# Patient Record
Sex: FEMALE | Race: BLACK OR AFRICAN AMERICAN | Employment: OTHER | URBAN - METROPOLITAN AREA
[De-identification: names, ages, dates, MRNs, and addresses within clinical notes are randomized per-mention and may not be internally consistent; named-entity substitution may affect disease eponyms.]

---

## 2017-01-19 ENCOUNTER — OFFICE VISIT (OUTPATIENT)
Dept: ONCOLOGY | Age: 72
End: 2017-01-19

## 2017-01-19 ENCOUNTER — HOSPITAL ENCOUNTER (OUTPATIENT)
Dept: LAB | Age: 72
Discharge: HOME | End: 2017-01-19
Payer: MEDICARE

## 2017-01-19 ENCOUNTER — HOSPITAL ENCOUNTER (OUTPATIENT)
Dept: ONCOLOGY | Age: 72
Discharge: HOME | End: 2017-01-19

## 2017-01-19 VITALS
SYSTOLIC BLOOD PRESSURE: 155 MMHG | DIASTOLIC BLOOD PRESSURE: 80 MMHG | HEART RATE: 78 BPM | TEMPERATURE: 98 F | BODY MASS INDEX: 22.99 KG/M2 | WEIGHT: 138 LBS | HEIGHT: 65 IN

## 2017-01-19 DIAGNOSIS — D64.9 ANEMIA, UNSPECIFIED TYPE: Primary | ICD-10-CM

## 2017-01-19 DIAGNOSIS — D46.20 MDS (MYELODYSPLASTIC SYNDROME), LOW GRADE (HCC): ICD-10-CM

## 2017-01-19 DIAGNOSIS — D64.9 ANEMIA, UNSPECIFIED TYPE: ICD-10-CM

## 2017-01-19 LAB
ALBUMIN SERPL BCP-MCNC: 4.4 G/DL (ref 3.4–5)
ALBUMIN/GLOB SERPL: 1.3 {RATIO} (ref 0.8–1.7)
ALP SERPL-CCNC: 129 U/L (ref 45–117)
ALT SERPL-CCNC: 26 U/L (ref 13–56)
ANION GAP BLD CALC-SCNC: 7 MMOL/L (ref 3–18)
AST SERPL W P-5'-P-CCNC: 18 U/L (ref 15–37)
BASO+EOS+MONOS # BLD AUTO: 0.6 K/UL (ref 0–2.3)
BASO+EOS+MONOS # BLD AUTO: 10 % (ref 0.1–17)
BILIRUB SERPL-MCNC: 0.7 MG/DL (ref 0.2–1)
BUN SERPL-MCNC: 22 MG/DL (ref 7–18)
BUN/CREAT SERPL: 18 (ref 12–20)
CALCIUM SERPL-MCNC: 9.4 MG/DL (ref 8.5–10.1)
CHLORIDE SERPL-SCNC: 104 MMOL/L (ref 100–108)
CO2 SERPL-SCNC: 28 MMOL/L (ref 21–32)
CREAT SERPL-MCNC: 1.2 MG/DL (ref 0.6–1.3)
DIFFERENTIAL METHOD BLD: ABNORMAL
ERYTHROCYTE [DISTWIDTH] IN BLOOD BY AUTOMATED COUNT: 12 % (ref 11.5–14.5)
FERRITIN SERPL-MCNC: 67 NG/ML (ref 8–388)
GLOBULIN SER CALC-MCNC: 3.4 G/DL (ref 2–4)
GLUCOSE SERPL-MCNC: 209 MG/DL (ref 74–99)
HCT VFR BLD AUTO: 30.5 % (ref 36–48)
HGB BLD-MCNC: 10.7 G/DL (ref 12–16)
IRON SATN MFR SERPL: 23 %
IRON SERPL-MCNC: 71 UG/DL (ref 50–175)
LYMPHOCYTES # BLD AUTO: 23 % (ref 14–44)
LYMPHOCYTES # BLD: 1.4 K/UL (ref 1.1–5.9)
MCH RBC QN AUTO: 30.3 PG (ref 25–35)
MCHC RBC AUTO-ENTMCNC: 35.1 G/DL (ref 31–37)
MCV RBC AUTO: 86.4 FL (ref 78–102)
NEUTS SEG # BLD: 3.9 K/UL (ref 1.8–9.5)
NEUTS SEG NFR BLD AUTO: 67 % (ref 40–70)
PLATELET # BLD AUTO: 181 K/UL (ref 140–440)
POTASSIUM SERPL-SCNC: 4.3 MMOL/L (ref 3.5–5.5)
PROT SERPL-MCNC: 7.8 G/DL (ref 6.4–8.2)
RBC # BLD AUTO: 3.53 M/UL (ref 4.1–5.1)
SODIUM SERPL-SCNC: 139 MMOL/L (ref 136–145)
TIBC SERPL-MCNC: 311 UG/DL (ref 250–450)
WBC # BLD AUTO: 5.9 K/UL (ref 4.5–13)

## 2017-01-19 PROCEDURE — 80053 COMPREHEN METABOLIC PANEL: CPT | Performed by: NURSE PRACTITIONER

## 2017-01-19 PROCEDURE — 82728 ASSAY OF FERRITIN: CPT | Performed by: NURSE PRACTITIONER

## 2017-01-19 PROCEDURE — 36415 COLL VENOUS BLD VENIPUNCTURE: CPT | Performed by: NURSE PRACTITIONER

## 2017-01-19 PROCEDURE — 83540 ASSAY OF IRON: CPT | Performed by: NURSE PRACTITIONER

## 2017-01-19 NOTE — MR AVS SNAPSHOT
Visit Information Date & Time Provider Department Dept. Phone Encounter #  
 1/19/2017 10:45 AM [facility] [phone] [id number] Follow-up Instructions Return in about 3 months (around 4/19/2017). Your Appointments 4/20/2017 10:45 AM  
Office Visit with MD Zeb [facility]ford Office ([facility]) Appt Note: [address] [facility] [address] [address]  
[address]  
  
   
 [address] [facility] [address] [address] Upcoming Health Maintenance Date Due  
 FOOT EXAM Q1 10/4/1955 MICROALBUMIN Q1 10/4/1954 EYE EXAM RETINAL OR DILATED Q1 10/4/1954 DTaP/Tdap/Td series (1 - Tdap) 10/4/1965 FOBT Q 1 YEAR AGE 50-75 10/4/1994 ZOSTER VACCINE AGE 60> 10/4/2004 GLAUCOMA SCREENING Q2Y 10/4/2009 Pneumococcal 65+ High/Highest Risk (1 of 2 - PCV13) 10/4/2009 MEDICARE YEARLY EXAM 10/4/2009 LIPID PANEL Q1 2/9/2011 HEMOGLOBIN A1C Q6M 4/6/2011 INFLUENZA AGE 9 TO ADULT 8/1/2016 BREAST CANCER SCRN MAMMOGRAM 10/6/2018 Allergies as of 1/19/2017  Review Complete On: 1/19/2017 By: Terrance Traylor NP No Known Allergies Current Immunizations  Never Reviewed No immunizations on file. Not reviewed this visit You Were Diagnosed With   
  
 Codes Comments Anemia, unspecified type    -  Primary ICD-10-CM: D64.9 ICD-9-CM: 285.9 MDS (myelodysplastic syndrome), low grade (HCC)     ICD-10-CM: S46.80 ICD-9-CM: 238.72 Vitals BP Pulse Temp Height(growth percentile) Weight(growth percentile) BMI  
 155/80 78 98 °F (36.7 °C) (Oral) 5' 5\" (1.651 m) 138 lb (62.6 kg) 22.96 kg/m2 Smoking Status Never Smoker BMI and BSA Data Body Mass Index Body Surface Area  
 22.96 kg/m 2 1.69 m 2 Your Updated Medication List  
  
   
This list is accurate as of: 1/19/17 11:19 AM.  Always use your most recent med list.  
  
  
  
  
 aspirin 81 mg tablet Take 81 mg by mouth.  
  
 benzonatate 200 mg capsule Commonly known as:  TESSALON  
  
 carvedilol 25 mg tablet Commonly known as:  Daysi Jigna Take 25 mg by mouth two (2) times daily (with meals). CHERATUSSIN -10 mg/5 mL solution Generic drug:  guaiFENesin-codeine  
  
 ciprofloxacin HCl 0.3 % ophthalmic solution Commonly known as:  CILOXIN  
  
 doxycycline 100 mg capsule Commonly known as:  VIBRAMYCIN  
  
 DUREZOL 0.05 % ophthalmic emulsion Generic drug:  Difluprednate  
  
 glimepiride 1 mg tablet Commonly known as:  AMARYL  
2 mg daily. hydroCHLOROthiazide 12.5 mg capsule Commonly known as:  Vertie Cheadle Take 12.5 mg by mouth daily. levothyroxine 50 mcg tablet Commonly known as:  SYNTHROID Take  by mouth Daily (before breakfast). metFORMIN 500 mg tablet Commonly known as:  GLUCOPHAGE  
two (2) times daily (with meals). montelukast 10 mg tablet Commonly known as:  SINGULAIR Take 10 mg by mouth daily. multivitamin tablet Commonly known as:  ONE A DAY Take 1 Tab by mouth daily. pantoprazole 40 mg tablet Commonly known as:  PROTONIX Take 40 mg by mouth daily. PROVENTIL HFA 90 mcg/actuation inhaler Generic drug:  albuterol  
  
 quinapril 40 mg tablet Commonly known as:  ACCUPRIL Take 40 mg by mouth nightly. simvastatin 40 mg tablet Commonly known as:  ZOCOR Take  by mouth nightly. TRUETEST TEST STRIPS strip Generic drug:  glucose blood VI test strips VITAMIN D2 50,000 unit capsule Generic drug:  ergocalciferol Take 50,000 Units by mouth. We Performed the Following COMPLETE CBC & AUTO DIFF WBC [79456 CPT(R)] Follow-up Instructions Return in about 3 months (around 4/19/2017). To-Do List   
 01/19/2017 Lab:  CBC WITH 3 PART DIFF   
  
 01/20/2017 Lab:  FERRITIN   
  
 01/20/2017   Lab:  IRON PROFILE   
  
 01/20/2017 Lab:  METABOLIC PANEL, COMPREHENSIVE [facility]! Dear Sedrick Larson: 
Thank you for requesting a T[employer] account. Our records indicate that you already have an active Tailwind account. You can access your account anytime at [email or web address] Did you know that you can access your hospital and ER discharge instructions at any time in T[employer]wind? You can also review all of your test results from your hospital stay or ER visit. Additional Information If you have questions, please visit the Frequently Asked Questions section of the Tailwind website at [email or web address] Remember, T[employer]wind is NOT to be used for urgent needs. For medical emergencies, dial 911. Now available from your iPhone and Android! Please provide this summary of care documentation to your next provider. Your primary care clinician is listed as [employer]. If you have any questions after today's visit, please call [phone].

## 2017-01-19 NOTE — PROGRESS NOTES
Hematology/Oncology  Progress Note    Name: Ricki Heath  Date: 2017  : 1945    PCP: Dr. Hermes Santiago    Ms. Catalina Camacho is a 66 y.o.  female with refractory anemia/early MDS. She has a bone marrow biopsy on 13 that revealed no evidence of hematopoetic neoplasm or dysplasia. She has present storage iron and normal female karyotype. Current Therapy: CBC Q3 months, EDUARDO therapy will be warranted if h/h drops below 10 and 30 respectively. Subjective:     Ms. Catalina Camacho is a 67-year-old woman with myelodysplastic syndrome and refractory anemia. The patient presents for routine 3 month follow up. She has no new concerns or complaints to report today. She reports she had a fall about 2 months ago and sustained a contusion on her left side which has healed. The pt denies SOB, CP, unexplained fevers or unexplained weight changes. Her appetite and energy level is reasonably good. She continues to exercise at  least 3-4 times weekly. She continues to take her iron once daily. Past Medical History   Diagnosis Date    Anemia NEC     Blurred vision     Cardiac defibrillator in place     Diabetes mellitus ([address]     Dyspepsia and other specified disorders of function of stomach     Heart attack ([address]     Heart disease     Hemorrhoid     Hypertension     Ill-defined condition      Myeloblastic Syndrome    Joint pain     Pacemaker     Sinus problem     Thyroid disorder      Past Surgical History   Procedure Laterality Date    Hx hysterectomy      Hx thyroidectomy      Hx pacemaker placement      Hx cataract removal Bilateral      Social History     Social History    Marital status: UNKNOWN     Spouse name: N/A    Number of children: N/A    Years of education: N/A     Occupational History    Not on file.      Social History Main Topics    Smoking status: Never Smoker    Smokeless tobacco: Never Used    Alcohol use No    Drug use: No    Sexual activity: No     Other Topics Concern    Not on file     Social History Narrative     Family History   Problem Relation Age of Onset    Diabetes Mother     Diabetes Father     Cancer Father     Heart Disease Father     Hypertension Father     Diabetes Sister     Hypertension Sister     Stroke Sister     Diabetes Sister     Hypertension Sister      Current Outpatient Prescriptions   Medication Sig Dispense Refill    PROVENTIL HFA 90 mcg/actuation inhaler       benzonatate (TESSALON) 200 mg capsule       doxycycline (VIBRAMYCIN) 100 mg capsule       CHERATUSSIN AC  mg/5 mL solution       ciprofloxacin (CILOXIN) 0.3 % ophthalmic solution       DUREZOL 0.05 % ophthalmic emulsion       glimepiride (AMARYL) 1 mg tablet 2 mg daily.  TRUETEST TEST STRIPS strip       metFORMIN (GLUCOPHAGE) 500 mg tablet two (2) times daily (with meals).  levothyroxine (SYNTHROID) 50 mcg tablet Take  by mouth Daily (before breakfast).  ergocalciferol (VITAMIN D2) 50,000 unit capsule Take 50,000 Units by mouth.  carvedilol (COREG) 25 mg tablet Take 25 mg by mouth two (2) times daily (with meals).  hydrochlorothiazide (MICROZIDE) 12.5 mg capsule Take 12.5 mg by mouth daily.  pantoprazole (PROTONIX) 40 mg tablet Take 40 mg by mouth daily.  aspirin 81 mg tablet Take 81 mg by mouth.  quinapril (ACCUPRIL) 40 mg tablet Take 40 mg by mouth nightly.  simvastatin (ZOCOR) 40 mg tablet Take  by mouth nightly.  montelukast (SINGULAIR) 10 mg tablet Take 10 mg by mouth daily.  multivitamin (ONE A DAY) tablet Take 1 Tab by mouth daily. Review of Systems  Constitutional: The patient has no acute distress or discomfort. HEENT: The patient denies recent head trauma, eye pain, blurred vision,  hearing deficit, oropharyngeal mucosal pain or lesions, and the patient denies throat pain or discomfort. Lymphatics: The patient denies palpable peripheral lymphadenopathy.   Hematologic: The patient denies having bruising, bleeding, or progressive fatigue. Respiratory: Patient denies having shortness of breath, cough, sputum production, fever, or dyspnea on exertion. Cardiovascular: The patient denies having leg pain, leg swelling, heart palpitations, chest permit, chest pain, or lightheadedness. The patient denies having dyspnea on exertion. Gastrointestinal: The patient denies having nausea, emesis, or diarrhea. The patient denies having any hematemesis or blood in the stool. Genitourinary: Patient denies having urinary urgency, frequency, or dysuria. The patient denies having blood in the urine. Psychological: The patient denies having symptoms of nervousness, anxiety, depression, or thoughts of harming himself some of this. Skin: Patient denies having skin rashes, skin, ulcerations, or unexplained itching or pruritus. Musculoskeletal: The patient denies having pain in the joints or bones. Objective:     Visit Vitals    /80    Pulse 78    Temp 98 °F (36.7 °C) (Oral)    Ht 5' 5\" (1.651 m)    Wt 62.6 kg (138 lb)    BMI 22.96 kg/m2     ECOG 0 Pain score 0/10  Physical Exam:   Gen. Appearance: The patient is in no acute distress. Skin: no bruise or rashes  HEENT: The exam is unremarkable. Neck: Supple without lymphadenopathy or thyromegaly. Lungs: Clear to auscultation and percussion; there are no wheezes or rhonchi. Heart: Regular rate and rhythm; there are no murmurs, gallops, or rubs. Abdomen: Bowel sounds are present and normal.  There is no guarding, tenderness, or hepatosplenomegaly. Extremities: There is no clubbing, cyanosis, or edema. Neurologic: There are no focal neurologic deficits. Lymphatics: There is no palpable peripheral lymphadenopathy.     Lab data:      Results for orders placed or performed during the hospital encounter of 01/19/17   CBC WITH 3 PART DIFF     Status: Abnormal   Result Value Ref Range Status    WBC 5.9 4.5 - 13.0 K/uL Final    RBC 3.53 (L) 4.10 - 5.10 M/uL Final HGB 10.7 (L) 12.0 - 16.0 g/dL Final    HCT 30.5 (L) 36 - 48 % Final    MCV 86.4 78 - 102 FL Final    MCH 30.3 25.0 - 35.0 PG Final    MCHC 35.1 31 - 37 g/dL Final    RDW 12.0 11.5 - 14.5 % Final    PLATELET 658 902 - 582 K/uL Final    NEUTROPHILS 67 40 - 70 % Final    MIXED CELLS 10 0.1 - 17 % Final    LYMPHOCYTES 23 14 - 44 % Final    ABS. NEUTROPHILS 3.9 1.8 - 9.5 K/UL Final    ABS. MIXED CELLS 0.6 0.0 - 2.3 K/uL Final    ABS. LYMPHOCYTES 1.4 1.1 - 5.9 K/UL Final     Comment: Test performed at [address]. Results Reviewed by Medical Director. DF AUTOMATED   Final           Assessment:     1. Anemia, unspecified type    2. MDS (myelodysplastic syndrome), low grade (HCC)          Plan:     MDS/refractory anemia: WBC is normal today at 5.0K/uL We will continue to monitor the patient closely and should the differential WBC count on the CBC shows any evidence of atypical cells, she may need to undergo further evaluation to rule out any evidence that she is converting to a higher grade MDS or possible chronic myelomonocytic leukemia. Anemia:The h.h has  declined to 10.7/30.5 and platelets [phone]. I did inform her that should the h/h decline below 10 and 30 respectively we will initiate EDUARDO  therapy. She will continue the iron tablet to twice daily. I will  check a CMP, iron panel and ferritin level today. We will see her for routine follow up in 3 months or sooner if indicated.      Orders Placed This Encounter    COMPLETE CBC & AUTO DIFF WBC    InHouse CBC ([place])     Standing Status:   Future     Number of Occurrences:   1     Standing Expiration Date:   3/30/9303    METABOLIC PANEL, COMPREHENSIVE     Standing Status:   Future     Standing Expiration Date:   1/20/2018    IRON PROFILE     Standing Status:   Future     Standing Expiration Date:   1/20/2018    FERRITIN     Standing Status:   Future     Standing Expiration Date:   1/20/2018 Lashawn Hartmann, MAI  January 19,2017

## 2017-05-03 ENCOUNTER — HOSPITAL ENCOUNTER (OUTPATIENT)
Dept: ONCOLOGY | Age: 72
Discharge: HOME | End: 2017-05-03

## 2017-05-03 ENCOUNTER — OFFICE VISIT (OUTPATIENT)
Dept: ONCOLOGY | Age: 72
End: 2017-05-03

## 2017-05-03 ENCOUNTER — HOSPITAL ENCOUNTER (OUTPATIENT)
Dept: LAB | Age: 72
Discharge: HOME | End: 2017-05-03
Payer: MEDICARE

## 2017-05-03 VITALS
HEART RATE: 79 BPM | HEIGHT: 65 IN | WEIGHT: 139 LBS | TEMPERATURE: 98.1 F | SYSTOLIC BLOOD PRESSURE: 110 MMHG | DIASTOLIC BLOOD PRESSURE: 62 MMHG | BODY MASS INDEX: 23.16 KG/M2

## 2017-05-03 DIAGNOSIS — D64.9 ANEMIA, UNSPECIFIED TYPE: ICD-10-CM

## 2017-05-03 DIAGNOSIS — D64.9 ANEMIA, UNSPECIFIED TYPE: Primary | ICD-10-CM

## 2017-05-03 DIAGNOSIS — D46.20 MDS (MYELODYSPLASTIC SYNDROME), LOW GRADE (HCC): ICD-10-CM

## 2017-05-03 LAB
ALBUMIN SERPL BCP-MCNC: 4.1 G/DL (ref 3.4–5)
ALBUMIN/GLOB SERPL: 1.1 {RATIO} (ref 0.8–1.7)
ALP SERPL-CCNC: 64 U/L (ref 45–117)
ALT SERPL-CCNC: 20 U/L (ref 13–56)
ANION GAP BLD CALC-SCNC: 10 MMOL/L (ref 3–18)
AST SERPL W P-5'-P-CCNC: 17 U/L (ref 15–37)
BASO+EOS+MONOS # BLD AUTO: 0.7 K/UL (ref 0–2.3)
BASO+EOS+MONOS # BLD AUTO: 10 % (ref 0.1–17)
BILIRUB SERPL-MCNC: 0.5 MG/DL (ref 0.2–1)
BUN SERPL-MCNC: 20 MG/DL (ref 7–18)
BUN/CREAT SERPL: 15 (ref 12–20)
CALCIUM SERPL-MCNC: 9.2 MG/DL (ref 8.5–10.1)
CHLORIDE SERPL-SCNC: 101 MMOL/L (ref 100–108)
CO2 SERPL-SCNC: 25 MMOL/L (ref 21–32)
CREAT SERPL-MCNC: 1.3 MG/DL (ref 0.6–1.3)
DIFFERENTIAL METHOD BLD: ABNORMAL
ERYTHROCYTE [DISTWIDTH] IN BLOOD BY AUTOMATED COUNT: 11.9 % (ref 11.5–14.5)
FERRITIN SERPL-MCNC: 121 NG/ML (ref 8–388)
GLOBULIN SER CALC-MCNC: 3.6 G/DL (ref 2–4)
GLUCOSE SERPL-MCNC: 162 MG/DL (ref 74–99)
HCT VFR BLD AUTO: 31.5 % (ref 36–48)
HGB BLD-MCNC: 10.8 G/DL (ref 12–16)
IRON SATN MFR SERPL: 23 %
IRON SERPL-MCNC: 66 UG/DL (ref 50–175)
LYMPHOCYTES # BLD AUTO: 25 % (ref 14–44)
LYMPHOCYTES # BLD: 1.7 K/UL (ref 1.1–5.9)
MCH RBC QN AUTO: 29.9 PG (ref 25–35)
MCHC RBC AUTO-ENTMCNC: 34.3 G/DL (ref 31–37)
MCV RBC AUTO: 87.3 FL (ref 78–102)
NEUTS SEG # BLD: 4.2 K/UL (ref 1.8–9.5)
NEUTS SEG NFR BLD AUTO: 65 % (ref 40–70)
PLATELET # BLD AUTO: 193 K/UL (ref 140–440)
POTASSIUM SERPL-SCNC: 4.6 MMOL/L (ref 3.5–5.5)
PROT SERPL-MCNC: 7.7 G/DL (ref 6.4–8.2)
RBC # BLD AUTO: 3.61 M/UL (ref 4.1–5.1)
SODIUM SERPL-SCNC: 136 MMOL/L (ref 136–145)
TIBC SERPL-MCNC: 287 UG/DL (ref 250–450)
WBC # BLD AUTO: 6.6 K/UL (ref 4.5–13)

## 2017-05-03 PROCEDURE — 80053 COMPREHEN METABOLIC PANEL: CPT | Performed by: NURSE PRACTITIONER

## 2017-05-03 PROCEDURE — 36415 COLL VENOUS BLD VENIPUNCTURE: CPT | Performed by: NURSE PRACTITIONER

## 2017-05-03 PROCEDURE — 82728 ASSAY OF FERRITIN: CPT | Performed by: NURSE PRACTITIONER

## 2017-05-03 PROCEDURE — 83540 ASSAY OF IRON: CPT | Performed by: NURSE PRACTITIONER

## 2017-05-03 NOTE — PROGRESS NOTES
Hematology/Oncology  Progress Note    Name: Glo Berry  Date: 2017  : 1945    PCP: Dr. Yolanda Brantley    Ms. Enrico Samuels is a 66 y.o.  female with refractory anemia/early MDS. She has a bone marrow biopsy on 13 that revealed no evidence of hematopoetic neoplasm or dysplasia. She has present storage iron and normal female karyotype. Current Therapy: CBC Q3 months, EDUARDO therapy will be warranted if h/h drops below 10 and 30 respectively. Subjective:     Ms. Enrico Samuels is a [id number] woman with myelodysplastic syndrome and refractory anemia. The patient presents for routine 3 month follow up. She reports she had a fall about 3 months ago and sustained a contusion on her left side which has healed. The pt denies SOB, CP, unexplained fevers or unexplained weight changes. Her appetite and energy level is reasonably good. She states she has not exercised in about 3 months due to her fall. She notes she has joined [employer] and lost 5 lbs so far. She also notes she had an U/S of her neck and 3 thyroid nodules were found incidently. These are monitored regularly by her PCP. She continues to take her iron once daily. Past Medical History:   Diagnosis Date    Anemia NEC     Blurred vision     Cardiac defibrillator in place     Diabetes mellitus ([address]     Dyspepsia and other specified disorders of function of stomach     Heart attack ([address]     Heart disease     Hemorrhoid     Hypertension     Ill-defined condition     Myeloblastic Syndrome    Joint pain     Pacemaker     Sinus problem     Thyroid disorder      Past Surgical History:   Procedure Laterality Date    HX CATARACT REMOVAL Bilateral     HX HYSTERECTOMY      HX PACEMAKER PLACEMENT      HX THYROIDECTOMY       Social History     Social History    Marital status: UNKNOWN     Spouse name: N/A    Number of children: N/A    Years of education: N/A     Occupational History    Not on file.      Social History Main Topics  Smoking status: Never Smoker    Smokeless tobacco: Never Used    Alcohol use No    Drug use: No    Sexual activity: No     Other Topics Concern    Not on file     Social History Narrative     Family History   Problem Relation Age of Onset    Diabetes Mother     Diabetes Father     Cancer Father     Heart Disease Father     Hypertension Father     Diabetes Sister     Hypertension Sister     Stroke Sister     Diabetes Sister     Hypertension Sister      Current Outpatient Prescriptions   Medication Sig Dispense Refill    PROVENTIL HFA 90 mcg/actuation inhaler       benzonatate (TESSALON) 200 mg capsule       doxycycline (VIBRAMYCIN) 100 mg capsule       CHERATUSSIN AC  mg/5 mL solution       ciprofloxacin (CILOXIN) 0.3 % ophthalmic solution       DUREZOL 0.05 % ophthalmic emulsion       glimepiride (AMARYL) 1 mg tablet 2 mg daily.  TRUETEST TEST STRIPS strip       metFORMIN (GLUCOPHAGE) 500 mg tablet two (2) times daily (with meals).  levothyroxine (SYNTHROID) 50 mcg tablet Take  by mouth Daily (before breakfast).  ergocalciferol (VITAMIN D2) 50,000 unit capsule Take 50,000 Units by mouth.  carvedilol (COREG) 25 mg tablet Take 25 mg by mouth two (2) times daily (with meals).  hydrochlorothiazide (MICROZIDE) 12.5 mg capsule Take 12.5 mg by mouth daily.  pantoprazole (PROTONIX) 40 mg tablet Take 40 mg by mouth daily.  aspirin 81 mg tablet Take 81 mg by mouth.  quinapril (ACCUPRIL) 40 mg tablet Take 40 mg by mouth nightly.  simvastatin (ZOCOR) 40 mg tablet Take  by mouth nightly.  montelukast (SINGULAIR) 10 mg tablet Take 10 mg by mouth daily.  multivitamin (ONE A DAY) tablet Take 1 Tab by mouth daily. Review of Systems  Constitutional: The patient has no acute distress or discomfort.   HEENT: The patient denies recent head trauma, eye pain, blurred vision,  hearing deficit, oropharyngeal mucosal pain or lesions, and the patient denies throat pain or discomfort. + 3 thyroid nodules. Lymphatics: The patient denies palpable peripheral lymphadenopathy. Hematologic: The patient denies having bruising, bleeding, or progressive fatigue. Respiratory: Patient denies having shortness of breath, cough, sputum production, fever, or dyspnea on exertion. Cardiovascular: The patient denies having leg pain, leg swelling, heart palpitations, chest permit, chest pain, or lightheadedness. The patient denies having dyspnea on exertion. Gastrointestinal: The patient denies having nausea, emesis, or diarrhea. The patient denies having any hematemesis or blood in the stool. Genitourinary: Patient denies having urinary urgency, frequency, or dysuria. The patient denies having blood in the urine. Psychological: The patient denies having symptoms of nervousness, anxiety, depression, or thoughts of harming himself some of this. Skin: Patient denies having skin rashes, skin, ulcerations, or unexplained itching or pruritus. Musculoskeletal: The patient denies having pain in the joints or bones. Objective:     Visit Vitals    /62    Pulse 79    Temp 98.1 °F (36.7 °C)    Ht 5' 5\" (1.651 m)    Wt 63 kg (139 lb)    BMI 23.13 kg/m2     ECOG 0 Pain score 0/10  Physical Exam:   Gen. Appearance: The patient is in no acute distress. Skin: no bruise or rashes  HEENT: The exam is unremarkable. Neck: Supple without lymphadenopathy or thyromegaly. Lungs: Clear to auscultation and percussion; there are no wheezes or rhonchi. Heart: Regular rate and rhythm; there are no murmurs, gallops, or rubs. Abdomen: Bowel sounds are present and normal.  There is no guarding, tenderness, or hepatosplenomegaly. Extremities: There is no clubbing, cyanosis, or edema. Neurologic: There are no focal neurologic deficits. Lymphatics: There is no palpable peripheral lymphadenopathy.     Lab data:      Results for orders placed or performed during the hospital encounter of 05/03/17   CBC WITH 3 PART DIFF     Status: Abnormal   Result Value Ref Range Status    WBC 6.6 4.5 - 13.0 K/uL Final    RBC 3.61 (L) 4.10 - 5.10 M/uL Final    HGB 10.8 (L) 12.0 - 16.0 g/dL Final    HCT 31.5 (L) 36 - 48 % Final    MCV 87.3 78 - 102 FL Final    MCH 29.9 25.0 - 35.0 PG Final    MCHC 34.3 31 - 37 g/dL Final    RDW 11.9 11.5 - 14.5 % Final    PLATELET 528 260 - 514 K/uL Final    NEUTROPHILS 65 40 - 70 % Final    MIXED CELLS 10 0.1 - 17 % Final    LYMPHOCYTES 25 14 - 44 % Final    ABS. NEUTROPHILS 4.2 1.8 - 9.5 K/UL Final    ABS. MIXED CELLS 0.7 0.0 - 2.3 K/uL Final    ABS. LYMPHOCYTES 1.7 1.1 - 5.9 K/UL Final     Comment: Test performed at [place] [address] Location. Results Reviewed by Medical Director. DF AUTOMATED   Final           Assessment:     1. Anemia, unspecified type    2. MDS (myelodysplastic syndrome), low grade (HCC)          Plan:     MDS/refractory anemia: WBC is normal today at 6.6 K/uL We will continue to monitor the patient closely and should the differential WBC count on the CBC shows any evidence of atypical cells, she may need to undergo further evaluation to rule out any evidence that she is converting to a higher grade MDS or possible chronic myelomonocytic leukemia. Anemia:The h.h remains stable at  to 10.8/31.5 and platelets [phone]. I did inform her that should the h/h decline below 10 and 30 respectively we will initiate EDUARDO  therapy. She will continue the iron tablets to twice daily. I will  check a CMP, iron panel and ferritin level today. We will see her for routine follow up in 3 months or sooner if indicated.        Orders Placed This Encounter    COMPLETE CBC & AUTO DIFF WBC    InHouse CBC ([place])     Standing Status:   Future     Number of Occurrences:   1     Standing Expiration Date:   8/19/5953    METABOLIC PANEL, COMPREHENSIVE     Standing Status:   Future     Number of Occurrences:   1     Standing Expiration Date:   5/4/2018  IRON PROFILE     Standing Status:   Future     Number of Occurrences:   1     Standing Expiration Date:   5/4/2018    FERRITIN     Standing Status:   Future     Number of Occurrences:   1     Standing Expiration Date:   5/4/2018       Evelyn oKch MD  5/3/2017

## 2017-05-03 NOTE — MR AVS SNAPSHOT
Visit Information Date & Time Provider Department Dept. Phone Encounter #  
 5/3/2017  4:15 PM Sanjana Adams MD [facility] Office [phone] [id number] Your Appointments 8/3/2017  9:30 AM  
[facility] [address]) Appt Note: 3 month return [address] [address] [address] [address]  
[address] [place]  
  
   
 [address] [facility] [address] [address] Upcoming Health Maintenance Date Due DTaP/Tdap/Td series (1 - Tdap) 10/4/1965 FOBT Q 1 YEAR AGE 50-75 10/4/1994 ZOSTER VACCINE AGE 60> 10/4/2004 GLAUCOMA SCREENING Q2Y 10/4/2009 Pneumococcal 65+ High/Highest Risk (1 of 2 - PCV13) 10/4/2009 MEDICARE YEARLY EXAM 10/4/2009 INFLUENZA AGE 9 TO ADULT 8/1/2017 BREAST CANCER SCRN MAMMOGRAM 10/6/2018 Allergies as of 5/3/2017  Review Complete On: 5/3/2017 By: Stacy Nesbitt NP No Known Allergies Current Immunizations  Never Reviewed No immunizations on file. Not reviewed this visit You Were Diagnosed With   
  
 Codes Comments Anemia, unspecified type    -  Primary ICD-10-CM: D64.9 ICD-9-CM: 285.9 MDS (myelodysplastic syndrome), low grade (HCC)     ICD-10-CM: E61.66 ICD-9-CM: 238.72 Vitals BP Pulse Temp Height(growth percentile) Weight(growth percentile) BMI  
 110/62 79 98.1 °F (36.7 °C) 5' 5\" (1.651 m) 139 lb (63 kg) 23.13 kg/m2 Smoking Status Never Smoker Vitals History BMI and BSA Data Body Mass Index Body Surface Area  
 23.13 kg/m 2 1.7 m 2 Your Updated Medication List  
  
   
This list is accurate as of: 5/3/17  4:36 PM.  Always use your most recent med list.  
  
  
  
  
 aspirin 81 mg tablet Take 81 mg by mouth.  
  
 benzonatate 200 mg capsule Commonly known as:  TESSALON  
  
 carvedilol 25 mg tablet Commonly known as:  Tesha Stable  
 Take 25 mg by mouth two (2) times daily (with meals). CHERATUSSIN -10 mg/5 mL solution Generic drug:  guaiFENesin-codeine  
  
 ciprofloxacin HCl 0.3 % ophthalmic solution Commonly known as:  CILOXIN  
  
 doxycycline 100 mg capsule Commonly known as:  VIBRAMYCIN  
  
 DUREZOL 0.05 % ophthalmic emulsion Generic drug:  Difluprednate  
  
 glimepiride 1 mg tablet Commonly known as:  AMARYL  
2 mg daily. hydroCHLOROthiazide 12.5 mg capsule Commonly known as:  Karla Dellen Take 12.5 mg by mouth daily. levothyroxine 50 mcg tablet Commonly known as:  SYNTHROID Take  by mouth Daily (before breakfast). metFORMIN 500 mg tablet Commonly known as:  GLUCOPHAGE  
two (2) times daily (with meals). montelukast 10 mg tablet Commonly known as:  SINGULAIR Take 10 mg by mouth daily. multivitamin tablet Commonly known as:  ONE A DAY Take 1 Tab by mouth daily. pantoprazole 40 mg tablet Commonly known as:  PROTONIX Take 40 mg by mouth daily. PROVENTIL HFA 90 mcg/actuation inhaler Generic drug:  albuterol  
  
 quinapril 40 mg tablet Commonly known as:  ACCUPRIL Take 40 mg by mouth nightly. simvastatin 40 mg tablet Commonly known as:  ZOCOR Take  by mouth nightly. TRUETEST TEST STRIPS strip Generic drug:  glucose blood VI test strips VITAMIN D2 50,000 unit capsule Generic drug:  ergocalciferol Take 50,000 Units by mouth. We Performed the Following COMPLETE CBC & AUTO DIFF WBC [27447 CPT(R)] To-Do List   
 05/03/2017 Lab:  CBC WITH 3 PART DIFF Patient Instructions Anemia: Care Instructions Your Care Instructions Anemia is a low level of red blood cells, which carry oxygen throughout your body. Many things can cause anemia. Lack of iron is one of the most common causes.  Your body needs iron to make hemoglobin, a substance in red blood cells that carries oxygen from the lungs to your body's cells. Without enough iron, the body produces fewer and smaller red blood cells. As a result, your body's cells do not get enough oxygen, and you feel tired and weak. And you may have trouble concentrating. Bleeding is the most common cause of a lack of iron. You may have heavy menstrual bleeding or bleeding caused by conditions such as ulcers, hemorrhoids, or cancer. Regular use of aspirin or other anti-inflammatory medicines (such as ibuprofen) also can cause bleeding in some people. A lack of iron in your diet also can cause anemia, especially at times when the body needs more iron, such as during pregnancy, infancy, and the teen years. Your doctor may have prescribed iron pills. It may take several months of treatment for your iron levels to return to normal. Your doctor also may suggest that you eat foods that are rich in iron, such as meat and beans. There are many other causes of anemia. It is not always due to a lack of iron. Finding the specific cause of your anemia will help your doctor find the right treatment for you. Follow-up care is a key part of your treatment and safety. Be sure to make and go to all appointments, and call your doctor if you are having problems. It's also a good idea to know your test results and keep a list of the medicines you take. How can you care for yourself at home? · Take your medicines exactly as prescribed. Call your doctor if you think you are having a problem with your medicine. · If your doctor recommends iron pills, take them as directed: ¨ Try to take the pills on an empty stomach about 1 hour before or 2 hours after meals. But you may need to take iron with food to avoid an upset stomach.  
¨ Do not take antacids or drink milk or caffeine drinks (such as coffee, tea, or cola) at the same time or within 2 hours of the time that you take your iron. They can make it hard for your body to absorb the iron. ¨ Vitamin C (from food or supplements) helps your body absorb iron. Try taking iron pills with a glass of orange juice or some other food that is high in vitamin C, such as citrus fruits. ¨ Iron pills may cause stomach problems, such as heartburn, nausea, diarrhea, constipation, and cramps. Be sure to drink plenty of fluids, and include fruits, vegetables, and fiber in your diet each day. Iron pills often make your bowel movements dark or green. ¨ If you forget to take an iron pill, do not take a double dose of iron the next time you take a pill. ¨ Keep iron pills out of the reach of small children. An overdose of iron can be very dangerous. · Follow your doctor's advice about eating iron-rich foods. These include red meat, shellfish, poultry, eggs, beans, raisins, whole-grain bread, and leafy green vegetables. · Steam vegetables to help them keep their iron content. When should you call for help? Call 911 anytime you think you may need emergency care. For example, call if: 
· You have symptoms of a heart attack. These may include: ¨ Chest pain or pressure, or a strange feeling in the chest. 
¨ Sweating. ¨ Shortness of breath. ¨ Nausea or vomiting. ¨ Pain, pressure, or a strange feeling in the back, neck, jaw, or upper belly or in one or both shoulders or arms. ¨ Lightheadedness or sudden weakness. ¨ A fast or irregular heartbeat. After you call 911, the  may tell you to chew 1 adult-strength or 2 to 4 low-dose aspirin. Wait for an ambulance. Do not try to drive yourself. · You passed out (lost consciousness). Call your doctor now or seek immediate medical care if: 
· You have new or increased shortness of breath. · You are dizzy or lightheaded, or you feel like you may faint. · Your fatigue and weakness continue or get worse. · You have any abnormal bleeding, such as: 
¨ Nosebleeds. ¨ Vaginal bleeding that is different (heavier, more frequent, at a different time of the month) than what you are used to. ¨ Bloody or black stools, or rectal bleeding. ¨ Bloody or pink urine. Watch closely for changes in your health, and be sure to contact your doctor if: 
· You do not get better as expected. Where can you learn more? Go to http://bindu-apollo.info/. Enter R301 in the search box to learn more about \"Anemia: Care Instructions. \" Current as of: October 13, 2016 Content Version: 11.2 © 9575-3262 [email or web address]. Care instructions adapted under license by [employer] (which disclaims liability or warranty for this information). If you have questions about a medical condition or this instruction, always ask your healthcare professional. [address] any warranty or liability for your use of this information. Introducing [facility] HE[facility]! Dear Ana Laura Tolbert: 
Thank you for requesting a [employer] account. Our records indicate that you already have an active [employer] account. You can access your account anytime at [email or web address] Did you know that you can access your hospital and ER discharge instructions at any time in [employer]? You can also review all of your test results from your hospital stay or ER visit. Additional Information If you have questions, please visit the Frequently Asked Questions section of the [employer] website at [email or web address] Remember, [employer] is NOT to be used for urgent needs. For medical emergencies, dial 911. Now available from your iPhone and Android! Please provide this summary of care documentation to your next provider. Your primary care clinician is listed as spigit. If you have any questions after today's visit, please call [address].

## 2017-05-03 NOTE — PATIENT INSTRUCTIONS
Anemia: Care Instructions  Your Care Instructions    Anemia is a low level of red blood cells, which carry oxygen throughout your body. Many things can cause anemia. Lack of iron is one of the most common causes. Your body needs iron to make hemoglobin, a substance in red blood cells that carries oxygen from the lungs to your body's cells. Without enough iron, the body produces fewer and smaller red blood cells. As a result, your body's cells do not get enough oxygen, and you feel tired and weak. And you may have trouble concentrating. Bleeding is the most common cause of a lack of iron. You may have heavy menstrual bleeding or bleeding caused by conditions such as ulcers, hemorrhoids, or cancer. Regular use of aspirin or other anti-inflammatory medicines (such as ibuprofen) also can cause bleeding in some people. A lack of iron in your diet also can cause anemia, especially at times when the body needs more iron, such as during pregnancy, infancy, and the teen years. Your doctor may have prescribed iron pills. It may take several months of treatment for your iron levels to return to normal. Your doctor also may suggest that you eat foods that are rich in iron, such as meat and beans. There are many other causes of anemia. It is not always due to a lack of iron. Finding the specific cause of your anemia will help your doctor find the right treatment for you. Follow-up care is a key part of your treatment and safety. Be sure to make and go to all appointments, and call your doctor if you are having problems. It's also a good idea to know your test results and keep a list of the medicines you take. How can you care for yourself at home? · Take your medicines exactly as prescribed. Call your doctor if you think you are having a problem with your medicine. · If your doctor recommends iron pills, take them as directed:  ¨ Try to take the pills on an empty stomach about 1 hour before or 2 hours after meals. But you may need to take iron with food to avoid an upset stomach. ¨ Do not take antacids or drink milk or caffeine drinks (such as coffee, tea, or cola) at the same time or within 2 hours of the time that you take your iron. They can make it hard for your body to absorb the iron. ¨ Vitamin C (from food or supplements) helps your body absorb iron. Try taking iron pills with a glass of orange juice or some other food that is high in vitamin C, such as citrus fruits. ¨ Iron pills may cause stomach problems, such as heartburn, nausea, diarrhea, constipation, and cramps. Be sure to drink plenty of fluids, and include fruits, vegetables, and fiber in your diet each day. Iron pills often make your bowel movements dark or green. ¨ If you forget to take an iron pill, do not take a double dose of iron the next time you take a pill. ¨ Keep iron pills out of the reach of small children. An overdose of iron can be very dangerous. · Follow your doctor's advice about eating iron-rich foods. These include red meat, shellfish, poultry, eggs, beans, raisins, whole-grain bread, and leafy green vegetables. · Steam vegetables to help them keep their iron content. When should you call for help? Call 911 anytime you think you may need emergency care. For example, call if:  · You have symptoms of a heart attack. These may include:  ¨ Chest pain or pressure, or a strange feeling in the chest.  ¨ Sweating. ¨ Shortness of breath. ¨ Nausea or vomiting. ¨ Pain, pressure, or a strange feeling in the back, neck, jaw, or upper belly or in one or both shoulders or arms. ¨ Lightheadedness or sudden weakness. ¨ A fast or irregular heartbeat. After you call 911, the  may tell you to chew 1 adult-strength or 2 to 4 low-dose aspirin. Wait for an ambulance. Do not try to drive yourself. · You passed out (lost consciousness).   Call your doctor now or seek immediate medical care if:  · You have new or increased shortness of breath. · You are dizzy or lightheaded, or you feel like you may faint. · Your fatigue and weakness continue or get worse. · You have any abnormal bleeding, such as:  ¨ Nosebleeds. ¨ Vaginal bleeding that is different (heavier, more frequent, at a different time of the month) than what you are used to. ¨ Bloody or black stools, or rectal bleeding. ¨ Bloody or pink urine. Watch closely for changes in your health, and be sure to contact your doctor if:  · You do not get better as expected. Where can you learn more? Go to http://bindu-apollo.info/. Enter R301 in the search box to learn more about \"Anemia: Care Instructions. \"  Current as of: October 13, 2016  Content Version: 11.2  © 9470-7012 [employer]. Care instructions adapted under license by [employer] (which disclaims liability or warranty for this information). If you have questions about a medical condition or this instruction, always ask your healthcare professional. [place] [address] any warranty or liability for your use of this information.

## 2017-08-03 ENCOUNTER — OFFICE VISIT (OUTPATIENT)
Dept: ONCOLOGY | Age: 72
End: 2017-08-03

## 2017-08-03 ENCOUNTER — HOSPITAL ENCOUNTER (OUTPATIENT)
Dept: ONCOLOGY | Age: 72
Discharge: HOME | End: 2017-08-03

## 2017-08-03 VITALS
DIASTOLIC BLOOD PRESSURE: 85 MMHG | HEIGHT: 65 IN | HEART RATE: 66 BPM | BODY MASS INDEX: 21.86 KG/M2 | WEIGHT: 131.2 LBS | TEMPERATURE: 97.3 F | SYSTOLIC BLOOD PRESSURE: 152 MMHG

## 2017-08-03 DIAGNOSIS — D64.9 CHRONIC ANEMIA: ICD-10-CM

## 2017-08-03 DIAGNOSIS — D46.20 MDS (MYELODYSPLASTIC SYNDROME), LOW GRADE (HCC): Primary | ICD-10-CM

## 2017-08-03 DIAGNOSIS — D46.20 MDS (MYELODYSPLASTIC SYNDROME), LOW GRADE (HCC): ICD-10-CM

## 2017-08-03 LAB
BASO+EOS+MONOS # BLD AUTO: 0.5 K/UL (ref 0–2.3)
BASO+EOS+MONOS # BLD AUTO: 9 % (ref 0.1–17)
DIFFERENTIAL METHOD BLD: ABNORMAL
ERYTHROCYTE [DISTWIDTH] IN BLOOD BY AUTOMATED COUNT: 12.7 % (ref 11.5–14.5)
HCT VFR BLD AUTO: 33.1 % (ref 36–48)
HGB BLD-MCNC: 11.4 G/DL (ref 12–16)
LYMPHOCYTES # BLD AUTO: 22 % (ref 14–44)
LYMPHOCYTES # BLD: 1.1 K/UL (ref 1.1–5.9)
MCH RBC QN AUTO: 30.5 PG (ref 25–35)
MCHC RBC AUTO-ENTMCNC: 34.4 G/DL (ref 31–37)
MCV RBC AUTO: 88.5 FL (ref 78–102)
NEUTS SEG # BLD: 3.6 K/UL (ref 1.8–9.5)
NEUTS SEG NFR BLD AUTO: 69 % (ref 40–70)
PLATELET # BLD AUTO: 167 K/UL (ref 140–440)
RBC # BLD AUTO: 3.74 M/UL (ref 4.1–5.1)
WBC # BLD AUTO: 5.2 K/UL (ref 4.5–13)

## 2017-08-03 NOTE — PATIENT INSTRUCTIONS
Myelodysplastic Syndromes: Care Instructions  Your Care Instructions  Myelodysplastic syndromes, also called MDS, are a group of rare conditions in which the bone marrow does not make enough healthy blood cells. Normally, the bone marrow makes red blood cells, white blood cells, and platelets. These cells carry oxygen in the blood, help the body fight infections, and help the blood clot. With MDS, you may feel weak and tired, get infections often, and bruise easily, although symptoms tend to vary. MDS is a form of blood cancer. In some cases, MDS can turn into acute myeloid leukemia (AML), another type of cancer. Some people develop MDS after treatment for cancer or exposure to pesticides or other chemicals. But in most cases, the cause of MDS is not known. Your doctor will use the results of blood tests to guide your treatment. There are many types of MDS, with different treatment plans for each. If you have enough red blood cells and are feeling all right, you may not need active treatment, but you and your doctor will want to watch your condition carefully. If you start feeling lightheaded and have no energy, you may need a blood transfusion. Your doctor also may give you antibiotics to prevent or treat infection. Follow-up care is a key part of your treatment and safety. Be sure to make and go to all appointments, and call your doctor if you are having problems. It's also a good idea to know your test results and keep a list of the medicines you take. How can you care for yourself at home? · Take your medicines exactly as prescribed. Call your doctor if you think you are having a problem with your medicine. You will get more details on the specific medicines your doctor prescribes. · If your doctor prescribed antibiotics, take them as directed. Do not stop taking them just because you feel better. You need to take the full course of antibiotics.  If you have side effects from antibiotics, tell your doctor. · Take steps to control your stress and workload. Learn relaxation techniques. ¨ Share your feelings. Stress and tension affect our emotions. By expressing your feelings to others, you may be able to understand and cope with them. ¨ Consider joining a support group. Talking about a problem with your spouse, a good friend, or other people with similar problems is a good way to reduce tension and stress. ¨ Express yourself with art. Try writing, crafts, dance, or art to relieve stress. ¨ Be kind to your body and mind. Getting enough sleep, eating a healthy diet, and taking time to do things you enjoy can contribute to an overall feeling of balance in your life and help reduce stress. ¨ Get help if you need it. Discuss your concerns with your doctor or counselor. · Do not smoke. Smoking can make blood problems worse. If you need help quitting, talk to your doctor about stop-smoking programs and medicines. These can increase your chances of quitting for good. · If you have not already done so, prepare a list of advance directives. Advance directives are instructions to your doctor and family members about what kind of care you want if you become unable to speak or express yourself. · Call the [employer] (2-266.800.2359) or visit its website at [employer] for more information. When should you call for help? Call 911 anytime you think you may need emergency care. For example, call if:  · You passed out (lost consciousness). · You vomit blood or what looks like coffee grounds. · You pass maroon or very bloody stools. Call your doctor now or seek immediate medical care if:  · Your stools are black and tarlike or have streaks of blood. · You have any unusual bleeding, such as:  ¨ Blood spots under the skin. ¨ A nosebleed that you cannot stop. ¨ Bleeding gums when you brush your teeth. ¨ Blood in your urine.   ¨ Vaginal bleeding when you are not having your period, or heavy period bleeding. · Your fatigue and weakness continue or get worse. · You have signs of infection, such as:  ¨ Increased pain, swelling, warmth, or redness. ¨ Red streaks leading from a sore. ¨ Pus draining from a sore. ¨ A fever. Watch closely for changes in your health, and be sure to contact your doctor if you have any problems. Where can you learn more? Go to http://bindu-apollo.info/. Enter Alirio Banegas in the search box to learn more about \"Myelodysplastic Syndromes: Care Instructions. \"  Current as of: October 24, 2016  Content Version: 11.3  © 6818-6976 [employer]. Care instructions adapted under license by [employer] (which disclaims liability or warranty for this information). If you have questions about a medical condition or this instruction, always ask your healthcare professional. [address] any warranty or liability for your use of this information.

## 2017-08-03 NOTE — MR AVS SNAPSHOT
Visit Information Date & Time Provider Department Dept. Phone Encounter #  
 8/3/2017  9:30 AM Jhon Addison MD [facility] Office [phone] [id number] Follow-up Instructions Return in about 3 months (around 11/3/2017). Your Appointments 11/2/2017 10:00 AM  
Office Visit with Jhon Addison MD  
[facility] Office [address]) Appt Note: 3 MO  [facility] [address] [address]  
[address]  
  
   
 [address] [facility] [address] [address] Upcoming Health Maintenance Date Due DTaP/Tdap/Td series (1 - Tdap) 10/4/1965 FOBT Q 1 YEAR AGE 50-75 10/4/1994 ZOSTER VACCINE AGE 60> 8/4/2004 GLAUCOMA SCREENING Q2Y 10/4/2009 Pneumococcal 65+ High/Highest Risk (1 of 2 - PCV13) 10/4/2009 MEDICARE YEARLY EXAM 10/4/2009 INFLUENZA AGE 9 TO ADULT 8/1/2017 BREAST CANCER SCRN MAMMOGRAM 10/6/2018 Allergies as of 8/3/2017  Review Complete On: 8/3/2017 By: Jhon Addison MD  
 No Known Allergies Current Immunizations  Never Reviewed No immunizations on file. Not reviewed this visit You Were Diagnosed With   
  
 Codes Comments MDS (myelodysplastic syndrome), low grade (HCC)    -  Primary ICD-10-CM: B89.73 ICD-9-CM: 238.72 Chronic anemia     ICD-10-CM: D64.9 ICD-9-CM: 673. 9 Vitals BP Pulse Temp Height(growth percentile) Weight(growth percentile) BMI  
 152/85 (BP 1 Location: Right arm, BP Patient Position: Sitting) 66 97.3 °F (36.3 °C) (Oral) 5' 5\" (1.651 m) 131 lb 3.2 oz (59.5 kg) 21.83 kg/m2 Smoking Status Never Smoker BMI and BSA Data Body Mass Index Body Surface Area  
 21.83 kg/m 2 1.65 m 2 Your Updated Medication List  
  
   
This list is accurate as of: 8/3/17 10:14 AM.  Always use your most recent med list.  
  
  
  
  
 aspirin 81 mg tablet Take 81 mg by mouth.  
  
 benzonatate 200 mg capsule Commonly known as:  TESSALON  
  
 carvedilol 25 mg tablet Commonly known as:  Brady Valera Take 25 mg by mouth two (2) times daily (with meals). CHERATUSSIN -10 mg/5 mL solution Generic drug:  guaiFENesin-codeine  
  
 ciprofloxacin HCl 0.3 % ophthalmic solution Commonly known as:  CILOXIN  
  
 doxycycline 100 mg capsule Commonly known as:  VIBRAMYCIN  
  
 DUREZOL 0.05 % ophthalmic emulsion Generic drug:  Difluprednate  
  
 glimepiride 1 mg tablet Commonly known as:  AMARYL  
2 mg daily. hydroCHLOROthiazide 12.5 mg capsule Commonly known as:  Karyle Spatz Take 12.5 mg by mouth daily. levothyroxine 50 mcg tablet Commonly known as:  SYNTHROID Take  by mouth Daily (before breakfast). metFORMIN 500 mg tablet Commonly known as:  GLUCOPHAGE  
two (2) times daily (with meals). montelukast 10 mg tablet Commonly known as:  SINGULAIR Take 10 mg by mouth daily. multivitamin tablet Commonly known as:  ONE A DAY Take 1 Tab by mouth daily. pantoprazole 40 mg tablet Commonly known as:  PROTONIX Take 40 mg by mouth daily. PROVENTIL HFA 90 mcg/actuation inhaler Generic drug:  albuterol  
  
 quinapril 40 mg tablet Commonly known as:  ACCUPRIL Take 40 mg by mouth nightly. simvastatin 40 mg tablet Commonly known as:  ZOCOR Take  by mouth nightly. TRUETEST TEST STRIPS strip Generic drug:  glucose blood VI test strips VITAMIN D2 50,000 unit capsule Generic drug:  ergocalciferol Take 50,000 Units by mouth. We Performed the Following COMPLETE CBC & AUTO DIFF WBC [71656 CPT(R)] Follow-up Instructions Return in about 3 months (around 11/3/2017). To-Do List   
 08/03/2017 Lab:  CBC WITH 3 PART DIFF Patient Instructions Myelodysplastic Syndromes: Care Instructions Your Care Instructions Myelodysplastic syndromes, also called MDS, are a group of rare conditions in which the bone marrow does not make enough healthy blood cells. Normally, the bone marrow makes red blood cells, white blood cells, and platelets. These cells carry oxygen in the blood, help the body fight infections, and help the blood clot. With MDS, you may feel weak and tired, get infections often, and bruise easily, although symptoms tend to vary. MDS is a form of blood cancer. In some cases, MDS can turn into acute myeloid leukemia (AML), another type of cancer. Some people develop MDS after treatment for cancer or exposure to pesticides or other chemicals. But in most cases, the cause of MDS is not known. Your doctor will use the results of blood tests to guide your treatment. There are many types of MDS, with different treatment plans for each. If you have enough red blood cells and are feeling all right, you may not need active treatment, but you and your doctor will want to watch your condition carefully. If you start feeling lightheaded and have no energy, you may need a blood transfusion. Your doctor also may give you antibiotics to prevent or treat infection. Follow-up care is a key part of your treatment and safety. Be sure to make and go to all appointments, and call your doctor if you are having problems. It's also a good idea to know your test results and keep a list of the medicines you take. How can you care for yourself at home? · Take your medicines exactly as prescribed. Call your doctor if you think you are having a problem with your medicine. You will get more details on the specific medicines your doctor prescribes. · If your doctor prescribed antibiotics, take them as directed. Do not stop taking them just because you feel better. You need to take the full course of antibiotics. If you have side effects from antibiotics, tell your doctor. · Take steps to control your stress and workload. Learn relaxation techniques. ¨ Share your feelings. Stress and tension affect our emotions. By expressing your feelings to others, you may be able to understand and cope with them. ¨ Consider joining a support group. Talking about a problem with your spouse, a good friend, or other people with similar problems is a good way to reduce tension and stress. ¨ Express yourself with art. Try writing, crafts, dance, or art to relieve stress. ¨ Be kind to your body and mind. Getting enough sleep, eating a healthy diet, and taking time to do things you enjoy can contribute to an overall feeling of balance in your life and help reduce stress. ¨ Get help if you need it. Discuss your concerns with your doctor or counselor. · Do not smoke. Smoking can make blood problems worse. If you need help quitting, talk to your doctor about stop-smoking programs and medicines. These can increase your chances of quitting for good. · If you have not already done so, prepare a list of advance directives. Advance directives are instructions to your doctor and family members about what kind of care you want if you become unable to speak or express yourself. · Call the [employer] (8-497.267.6803) or visit its website at [address] for more information. When should you call for help? Call 911 anytime you think you may need emergency care. For example, call if: 
· You passed out (lost consciousness). · You vomit blood or what looks like coffee grounds. · You pass maroon or very bloody stools. Call your doctor now or seek immediate medical care if: 
· Your stools are black and tarlike or have streaks of blood. · You have any unusual bleeding, such as: ¨ Blood spots under the skin. ¨ A nosebleed that you cannot stop. ¨ Bleeding gums when you brush your teeth. ¨ Blood in your urine. ¨ Vaginal bleeding when you are not having your period, or heavy period bleeding. · Your fatigue and weakness continue or get worse. · You have signs of infection, such as: 
¨ Increased pain, swelling, warmth, or redness. ¨ Red streaks leading from a sore. ¨ Pus draining from a sore. ¨ A fever. Watch closely for changes in your health, and be sure to contact your doctor if you have any problems. Where can you learn more? Go to http://bindu-apollo.info/. Enter Rima [place] in the search box to learn more about \"Myelodysplastic Syndromes: Care Instructions. \" Current as of: October 24, 2016 Content Version: 11.3 © 9864-6958 [employer]. Care instructions adapted under license by [employer] (which disclaims liability or warranty for this information). If you have questions about a medical condition or this instruction, always ask your healthcare professional. [address] any warranty or liability for your use of this information. Introducing [facility]! Dear Bony Amin: 
Thank you for requesting a ixigo account. Our records indicate that you already have an active ixigo account. You can access your account anytime at [email or web address] Did you know that you can access your hospital and ER discharge instructions at any time in ixigo? You can also review all of your test results from your hospital stay or ER visit. Additional Information If you have questions, please visit the Frequently Asked Questions section of the ixigo website at [email or web address] Remember, ixigo is NOT to be used for urgent needs. For medical emergencies, dial 911. Now available from your iPhone and Android! Please provide this summary of care documentation to your next provider. Your primary care clinician is listed as [employer]. If you have any questions after today's visit, please call [phone].

## 2017-08-03 NOTE — PROGRESS NOTES
Hematology/Oncology  Progress Note    Name: Lauren Srivastava  Date: 2017  : 1945    PCP: Dr. Eddie Ponce    Ms. Mercy Velez is a 66 y.o.  female with refractory anemia/early MDS. She had a bone marrow biopsy on 13 that revealed no evidence of hematopoetic neoplasm or dysplasia. She has present storage iron and normal female karyotype. Current Therapy: CBC Q3 months, EDUARDO therapy will be warranted if h/h drops below 10 and 30 respectively. Subjective:     Ms. Mercy Velez is a 65-year-old woman with myelodysplastic syndrome and refractory anemia. The patient presents for routine 3 month follow up. The pt denies SOB, CP, unexplained fevers or unexplained weight changes. Her appetite and energy level is reasonably good. She states she has change her diet and eating more healthy foods. She continues to take her iron once daily. She does not have any question or issues at present. Past Medical History:   Diagnosis Date    Anemia NEC     Blurred vision     Cardiac defibrillator in place     Diabetes mellitus ([address]     Dyspepsia and other specified disorders of function of stomach     Heart attack ([address]     Heart disease     Hemorrhoid     Hypertension     Ill-defined condition     Myeloblastic Syndrome    Joint pain     Pacemaker     Sinus problem     Thyroid disorder      Past Surgical History:   Procedure Laterality Date    HX CATARACT REMOVAL Bilateral     HX HYSTERECTOMY      HX PACEMAKER PLACEMENT      HX THYROIDECTOMY       Social History     Social History    Marital status: UNKNOWN     Spouse name: N/A    Number of children: N/A    Years of education: N/A     Occupational History    Not on file.      Social History Main Topics    Smoking status: Never Smoker    Smokeless tobacco: Never Used    Alcohol use No    Drug use: No    Sexual activity: No     Other Topics Concern    Not on file     Social History Narrative     Family History   Problem Relation Age of Onset    Diabetes Mother     Diabetes Father     Cancer Father     Heart Disease Father     Hypertension Father     Diabetes Sister     Hypertension Sister     Stroke Sister     Diabetes Sister     Hypertension Sister      Current Outpatient Prescriptions   Medication Sig Dispense Refill    PROVENTIL HFA 90 mcg/actuation inhaler       benzonatate (TESSALON) 200 mg capsule       doxycycline (VIBRAMYCIN) 100 mg capsule       CHERATUSSIN AC  mg/5 mL solution       ciprofloxacin (CILOXIN) 0.3 % ophthalmic solution       DUREZOL 0.05 % ophthalmic emulsion       glimepiride (AMARYL) 1 mg tablet 2 mg daily.  TRUETEST TEST STRIPS strip       metFORMIN (GLUCOPHAGE) 500 mg tablet two (2) times daily (with meals).  levothyroxine (SYNTHROID) 50 mcg tablet Take  by mouth Daily (before breakfast).  ergocalciferol (VITAMIN D2) 50,000 unit capsule Take 50,000 Units by mouth.  carvedilol (COREG) 25 mg tablet Take 25 mg by mouth two (2) times daily (with meals).  hydrochlorothiazide (MICROZIDE) 12.5 mg capsule Take 12.5 mg by mouth daily.  pantoprazole (PROTONIX) 40 mg tablet Take 40 mg by mouth daily.  aspirin 81 mg tablet Take 81 mg by mouth.  quinapril (ACCUPRIL) 40 mg tablet Take 40 mg by mouth nightly.  simvastatin (ZOCOR) 40 mg tablet Take  by mouth nightly.  montelukast (SINGULAIR) 10 mg tablet Take 10 mg by mouth daily.  multivitamin (ONE A DAY) tablet Take 1 Tab by mouth daily. Review of Systems  Constitutional: The patient has no acute distress or discomfort. HEENT: The patient denies recent head trauma, eye pain, blurred vision,  hearing deficit, oropharyngeal mucosal pain or lesions, and the patient denies throat pain or discomfort. Lymphatics: The patient denies palpable peripheral lymphadenopathy. Hematologic: The patient denies having bruising, bleeding, or progressive fatigue.   Respiratory: Patient denies having shortness of breath, cough, sputum production, fever, or dyspnea on exertion. Cardiovascular: The patient denies having leg pain, leg swelling, heart palpitations, chest permit, chest pain, or lightheadedness. The patient denies having dyspnea on exertion. Gastrointestinal: The patient denies having nausea, emesis, or diarrhea. The patient denies having any hematemesis or blood in the stool. Genitourinary: Patient denies having urinary urgency, frequency, or dysuria. The patient denies having blood in the urine. Psychological: The patient denies having symptoms of nervousness, anxiety, depression, or thoughts of harming himself some of this. Skin: Patient denies having skin rashes, skin, ulcerations, or unexplained itching or pruritus. Musculoskeletal: The patient denies having pain in the joints or bones. Objective:     Visit Vitals    /85 (BP 1 Location: Right arm, BP Patient Position: Sitting)    Pulse 66    Temp 97.3 °F (36.3 °C) (Oral)    Ht 5' 5\" (1.651 m)    Wt 59.5 kg (131 lb 3.2 oz)    BMI 21.83 kg/m2     ECOG 0 Pain score 0/10  Physical Exam:   Gen. Appearance: The patient is in no acute distress. Skin: no bruise or rashes  HEENT: The exam is unremarkable. Neck: Supple without lymphadenopathy or thyromegaly. Lungs: Clear to auscultation and percussion; there are no wheezes or rhonchi. Heart: Regular rate and rhythm; there are no murmurs, gallops, or rubs. Abdomen: Bowel sounds are present and normal.  There is no guarding, tenderness, or hepatosplenomegaly. Extremities: There is no clubbing, cyanosis, or edema. Neurologic: There are no focal neurologic deficits. Lymphatics: There is no palpable peripheral lymphadenopathy.     Lab data:      Results for orders placed or performed during the hospital encounter of 08/03/17   CBC WITH 3 PART DIFF     Status: Abnormal   Result Value Ref Range Status    WBC 5.2 4.5 - 13.0 K/uL Final    RBC 3.74 (L) 4.10 - 5.10 M/uL Final    HGB 11.4 (L) 12.0 - 16.0 g/dL Final    HCT 33.1 (L) 36 - 48 % Final    MCV 88.5 78 - 102 FL Final    MCH 30.5 25.0 - 35.0 PG Final    MCHC 34.4 31 - 37 g/dL Final    RDW 12.7 11.5 - 14.5 % Final    PLATELET 812 228 - 764 K/uL Final    NEUTROPHILS 69 40 - 70 % Final    MIXED CELLS 9 0.1 - 17 % Final    LYMPHOCYTES 22 14 - 44 % Final    ABS. NEUTROPHILS 3.6 1.8 - 9.5 K/UL Final    ABS. MIXED CELLS 0.5 0.0 - 2.3 K/uL Final    ABS. LYMPHOCYTES 1.1 1.1 - 5.9 K/UL Final     Comment: Test performed at [place] [id number] Location. Results Reviewed by Medical Director. DF AUTOMATED   Final           Assessment:     1. MDS (myelodysplastic syndrome), low grade ([facility] [address]    2. Chronic anemia          Plan:     MDS/refractory anemia: WBC is normal today at 5.2 K/uL We will continue to monitor the patient closely and should the differential WBC count on the CBC shows any evidence of atypical cells, she may need to undergo further evaluation to rule out any evidence that she is converting to a higher grade MDS or possible chronic myelomonocytic leukemia. Anemia:The hemoglobin and hematocrit remains stable at  to 11.4 g/dl and 33.1% and platelets [phone]. I did inform her that should the h/h should decline below 10 and 30 respectively we will initiate EDUARDO  therapy. She will continue the iron tablets to twice daily. I will  check a CMP, iron panel and ferritin level today. We will see her for routine follow up in 3 months or sooner if indicated.        Orders Placed This Encounter    COMPLETE CBC & AUTO DIFF WBC    InHouse CBC ([employer])     Standing Status:   Future     Number of Occurrences:   1     Standing Expiration Date:   8/10/2017    IRON PROFILE     Standing Status:   Future     Number of Occurrences:   1     Standing Expiration Date:   0/3/8485    METABOLIC PANEL, COMPREHENSIVE     Standing Status:   Future     Number of Occurrences:   1     Standing Expiration Date:   8/4/2018    FERRITIN     Standing Status:   Future Number of Occurrences:   1     Standing Expiration Date:   8/4/2018       Pratibha Walden MD  08/03/2017

## 2017-08-04 LAB
ALBUMIN SERPL-MCNC: 4.5 G/DL (ref 3.5–4.8)
ALBUMIN/GLOB SERPL: 1.5 {RATIO} (ref 1.2–2.2)
ALP SERPL-CCNC: 71 IU/L (ref 39–117)
ALT SERPL-CCNC: 14 IU/L (ref 0–32)
AST SERPL-CCNC: 23 IU/L (ref 0–40)
BILIRUB SERPL-MCNC: 0.6 MG/DL (ref 0–1.2)
BUN SERPL-MCNC: 14 MG/DL (ref 8–27)
BUN/CREAT SERPL: 13 (ref 12–28)
CALCIUM SERPL-MCNC: 9.6 MG/DL (ref 8.7–10.3)
CHLORIDE SERPL-SCNC: 104 MMOL/L (ref 96–106)
CO2 SERPL-SCNC: 26 MMOL/L (ref 18–29)
CREAT SERPL-MCNC: 1.05 MG/DL (ref 0.57–1)
FERRITIN SERPL-MCNC: 89 NG/ML (ref 15–150)
GLOBULIN SER CALC-MCNC: 3.1 G/DL (ref 1.5–4.5)
GLUCOSE SERPL-MCNC: 137 MG/DL (ref 65–99)
IRON SATN MFR SERPL: 19 % (ref 15–55)
IRON SERPL-MCNC: 57 UG/DL (ref 27–139)
POTASSIUM SERPL-SCNC: 4.7 MMOL/L (ref 3.5–5.2)
PROT SERPL-MCNC: 7.6 G/DL (ref 6–8.5)
SODIUM SERPL-SCNC: 143 MMOL/L (ref 134–144)
TIBC SERPL-MCNC: 298 UG/DL (ref 250–450)
UIBC SERPL-MCNC: 241 UG/DL (ref 118–369)

## 2017-10-04 PROBLEM — E07.9 THYROID MASS: Status: ACTIVE | Noted: 2017-10-04

## 2017-10-04 PROBLEM — E89.0 H/O THYROIDECTOMY: Status: ACTIVE | Noted: 2017-10-04

## 2017-11-02 ENCOUNTER — OFFICE VISIT (OUTPATIENT)
Dept: ONCOLOGY | Age: 72
End: 2017-11-02

## 2017-11-02 ENCOUNTER — HOSPITAL ENCOUNTER (OUTPATIENT)
Dept: ONCOLOGY | Age: 72
Discharge: HOME | End: 2017-11-02

## 2017-11-02 VITALS
BODY MASS INDEX: 24.13 KG/M2 | SYSTOLIC BLOOD PRESSURE: 135 MMHG | WEIGHT: 136.2 LBS | TEMPERATURE: 97.5 F | DIASTOLIC BLOOD PRESSURE: 76 MMHG | HEART RATE: 67 BPM

## 2017-11-02 DIAGNOSIS — E07.9 THYROID MASS: ICD-10-CM

## 2017-11-02 DIAGNOSIS — D46.20 MDS (MYELODYSPLASTIC SYNDROME), LOW GRADE (HCC): Primary | ICD-10-CM

## 2017-11-02 DIAGNOSIS — D64.9 CHRONIC ANEMIA: ICD-10-CM

## 2017-11-02 DIAGNOSIS — D46.20 MDS (MYELODYSPLASTIC SYNDROME), LOW GRADE (HCC): ICD-10-CM

## 2017-11-02 LAB
BASO+EOS+MONOS # BLD AUTO: 0.6 K/UL (ref 0–2.3)
BASO+EOS+MONOS # BLD AUTO: 12 % (ref 0.1–17)
DIFFERENTIAL METHOD BLD: ABNORMAL
ERYTHROCYTE [DISTWIDTH] IN BLOOD BY AUTOMATED COUNT: 12.8 % (ref 11.5–14.5)
HCT VFR BLD AUTO: 29.8 % (ref 36–48)
HGB BLD-MCNC: 10.1 G/DL (ref 12–16)
LYMPHOCYTES # BLD: 1.2 K/UL (ref 1.1–5.9)
LYMPHOCYTES NFR BLD: 26 % (ref 14–44)
MCH RBC QN AUTO: 30.7 PG (ref 25–35)
MCHC RBC AUTO-ENTMCNC: 33.9 G/DL (ref 31–37)
MCV RBC AUTO: 90.6 FL (ref 78–102)
NEUTS SEG # BLD: 2.7 K/UL (ref 1.8–9.5)
NEUTS SEG NFR BLD: 62 % (ref 40–70)
PLATELET # BLD AUTO: 168 K/UL (ref 140–440)
RBC # BLD AUTO: 3.29 M/UL (ref 4.1–5.1)
WBC # BLD AUTO: 4.5 K/UL (ref 4.5–13)

## 2017-11-02 NOTE — PROGRESS NOTES
Hematology/Oncology  Progress Note    Name: Cierra Phelps  Date: 2017  : 1945    PCP: Dr. Eriberto Haji    Ms. Sohail Samuel is a 67 y.o.  female with refractory anemia/early MDS. She had a bone marrow biopsy on 13 that revealed no evidence of hematopoetic neoplasm or dysplasia. She has present storage iron and normal female karyotype. Current Therapy: CBC Q3 months, EDUARDO therapy will be warranted if h/h drops below 10 and 30 respectively. Subjective:     Ms. Sohail Samuel is a 54-year-old woman with myelodysplastic syndrome and refractory anemia. The patient presents for routine 3 month follow up. The pt denies SOB, CP, unexplained fevers or unexplained weight changes. Her appetite and energy level is reasonably good. She states she has change her diet and eating more healthy foods. She continues to take her iron once daily. She does not have any question or issues at present. There are no new complaints.     Past Medical History:   Diagnosis Date    Allergic rhinitis     Anemia     Anemia NEC     Arthritis     Blurred vision     Cardiac defibrillator in place     Diabetes mellitus ([address]     Dyspepsia and other specified disorders of function of stomach     GERD (gastroesophageal reflux disease)     Heart attack     Heart disease     Hemorrhoid     Hyperlipemia     Hypertension     Ill-defined condition     Myeloblastic Syndrome    Joint pain     LBBB (left bundle branch block)     MDS (myelodysplastic syndrome) (HCC)     Pacemaker     Pacemaker     Sinus problem     SOB (shortness of breath)     Thyroid disorder     Vitamin D deficiency      Past Surgical History:   Procedure Laterality Date    HX CATARACT REMOVAL Bilateral     HX CHOLECYSTECTOMY      HX HYSTERECTOMY      HX PACEMAKER      [employer]    HX PACEMAKER PLACEMENT      HX THYROIDECTOMY       Social History     Social History    Marital status: UNKNOWN     Spouse name: N/A    Number of children: N/A    Years of education: N/A     Occupational History    Not on file. Social History Main Topics    Smoking status: Never Smoker    Smokeless tobacco: Never Used    Alcohol use No    Drug use: No    Sexual activity: Not on file     Other Topics Concern    Not on file     Social History Narrative     Family History   Problem Relation Age of Onset    Diabetes Mother     Diabetes Father     Cancer Father     Heart Disease Father     Hypertension Father     Diabetes Sister     Hypertension Sister     Stroke Sister     Diabetes Sister     Hypertension Sister      Current Outpatient Prescriptions   Medication Sig Dispense Refill    Loratadine (CHILDREN'S CLARITIN) 5 mg chew Take 1 Tab by mouth daily.  levocetirizine (XYZAL) 5 mg tablet Take  by mouth daily.  nitroglycerin (NITROSTAT) 0.4 mg SL tablet by SubLINGual route as needed for Chest Pain.  b complex vitamins (B COMPLEX 1) tablet Take 1 Tab by mouth daily.  LACTOBACILLUS ACIDOPHILUS (PROBIOTIC PO) Take  by mouth daily.  ascorbic acid, vitamin C, (VITAMIN C) 500 mg tablet Take  by mouth daily.  metFORMIN (GLUCOPHAGE) 500 mg tablet two (2) times daily (with meals).  levothyroxine (SYNTHROID) 50 mcg tablet Take  by mouth Daily (before breakfast).  ergocalciferol (VITAMIN D2) 50,000 unit capsule Take 50,000 Units by mouth every fourteen (14) days.  carvedilol (COREG) 25 mg tablet Take 25 mg by mouth two (2) times daily (with meals).  pantoprazole (PROTONIX) 40 mg tablet Take 40 mg by mouth daily.  aspirin 81 mg tablet Take 81 mg by mouth.  quinapril (ACCUPRIL) 40 mg tablet Take 40 mg by mouth daily.  simvastatin (ZOCOR) 40 mg tablet Take  by mouth nightly.  multivitamin (ONE A DAY) tablet Take 1 Tab by mouth daily. Review of Systems  Constitutional: The patient has no acute distress or discomfort.   HEENT: The patient denies recent head trauma, eye pain, blurred vision, hearing deficit, oropharyngeal mucosal pain or lesions, and the patient denies throat pain or discomfort. Lymphatics: The patient denies palpable peripheral lymphadenopathy. Hematologic: The patient denies having bruising, bleeding, or progressive fatigue. Respiratory: Patient denies having shortness of breath, cough, sputum production, fever, or dyspnea on exertion. Cardiovascular: The patient denies having leg pain, leg swelling, heart palpitations, chest permit, chest pain, or lightheadedness. The patient denies having dyspnea on exertion. Gastrointestinal: The patient denies having nausea, emesis, or diarrhea. The patient denies having any hematemesis or blood in the stool. Genitourinary: Patient denies having urinary urgency, frequency, or dysuria. The patient denies having blood in the urine. Psychological: The patient denies having symptoms of nervousness, anxiety, depression, or thoughts of harming himself some of this. Skin: Patient denies having skin rashes, skin, ulcerations, or unexplained itching or pruritus. Musculoskeletal: The patient denies having pain in the joints or bones. Objective:     Visit Vitals    /76 (BP 1 Location: Left arm, BP Patient Position: Sitting)    Pulse 67    Temp 97.5 °F (36.4 °C) (Oral)    Wt 61.8 kg (136 lb 3.2 oz)    BMI 24.13 kg/m2     ECOG 0 Pain score 0/10  Physical Exam:   Gen. Appearance: The patient is in no acute distress. Skin: no bruise or rashes  HEENT: The exam is unremarkable. Neck: Supple without lymphadenopathy or thyromegaly. Lungs: Clear to auscultation and percussion; there are no wheezes or rhonchi. Heart: Regular rate and rhythm; there are no murmurs, gallops, or rubs. Abdomen: Bowel sounds are present and normal.  There is no guarding, tenderness, or hepatosplenomegaly. Extremities: There is no clubbing, cyanosis, or edema. Neurologic: There are no focal neurologic deficits. Lymphatics:  There is no palpable peripheral lymphadenopathy. Lab data:      Results for orders placed or performed during the hospital encounter of 11/02/17   CBC WITH 3 PART DIFF     Status: Abnormal   Result Value Ref Range Status    WBC 4.5 4.5 - 13.0 K/uL Final    RBC 3.29 (L) 4.10 - 5.10 M/uL Final    HGB 10.1 (L) 12.0 - 16.0 g/dL Final    HCT 29.8 (L) 36 - 48 % Final    MCV 90.6 78 - 102 FL Final    MCH 30.7 25.0 - 35.0 PG Final    MCHC 33.9 31 - 37 g/dL Final    RDW 12.8 11.5 - 14.5 % Final    PLATELET 693 056 - 909 K/uL Final    NEUTROPHILS 62 40 - 70 % Final    MIXED CELLS 12 0.1 - 17 % Final    LYMPHOCYTES 26 14 - 44 % Final    ABS. NEUTROPHILS 2.7 1.8 - 9.5 K/UL Final    ABS. MIXED CELLS 0.6 0.0 - 2.3 K/uL Final    ABS. LYMPHOCYTES 1.2 1.1 - 5.9 K/UL Final     Comment: Test performed at [address]. Results Reviewed by Medical Director. DF AUTOMATED   Final           Assessment:     1. MDS (myelodysplastic syndrome), low grade ([facility] [address]    2. Chronic anemia    3. Thyroid mass          Plan:     MDS/refractory anemia: WBC is normal today at 4.5 k/uL We will continue to monitor the patient closely and should the differential WBC count on the CBC shows any evidence of atypical cells, she may need to undergo further evaluation to rule out any evidence that she is converting to a higher grade MDS or possible chronic myelomonocytic leukemia. Anemia:The hemoglobin and hematocrit remains stable at  to 10.1 g/dl and 29.8 % and platelets 3259,669. I did inform her that should the hemoglobin and hematocrit decline below 10 and 30 respectively we will initiate EDUARDO  therapy. She will continue the iron tablets  twice daily. I will  check a CMP, iron panel and ferritin level today. Thyroid mass: Since her last clinic visit, the patient has undergone partial thyroidectomy with removal of a benign lesion in her thyroid. She does have a long-standing history of hypothyroidism and is currently on hormone replacement therapy.      We will see her for routine follow up in 3 months or sooner if indicated. Orders Placed This Encounter    COMPLETE CBC & AUTO DIFF WBC    InHouse CBC ([employer])     Standing Status:   Future     Number of Occurrences:   1     Standing Expiration Date:   11/9/2017       Aashish Ybarra MD  11/2/2017

## 2017-11-02 NOTE — MR AVS SNAPSHOT
Visit Information Date & Time Provider Department Dept. Phone Encounter #  
 11/2/2017 10:00 AM Jesus Stoll MD [facility] [phone] [id number] Follow-up Instructions Return in about 3 months (around 2/2/2018). Upcoming Health Maintenance Date Due DTaP/Tdap/Td series (1 - Tdap) 10/4/1965 FOBT Q 1 YEAR AGE 50-75 10/4/1994 ZOSTER VACCINE AGE 60> 8/4/2004 GLAUCOMA SCREENING Q2Y 10/4/2009 Pneumococcal 65+ High/Highest Risk (1 of 2 - PCV13) 10/4/2009 MEDICARE YEARLY EXAM 10/4/2009 INFLUENZA AGE 9 TO ADULT 8/1/2017 BREAST CANCER SCRN MAMMOGRAM 10/17/2019 Allergies as of 11/2/2017  Review Complete On: 10/4/2017 By: Jacinto Mckeon Severity Noted Reaction Type Reactions Codeine  06/16/2016    Other (comments) Iodinated Contrast- Oral And Iv Dye  06/16/2016    Other (comments) Tramadol  06/16/2016    Other (comments) Current Immunizations  Never Reviewed No immunizations on file. Not reviewed this visit You Were Diagnosed With   
  
 Codes Comments MDS (myelodysplastic syndrome), low grade (HCC)    -  Primary ICD-10-CM: A40.51 ICD-9-CM: 238.72 Chronic anemia     ICD-10-CM: D64.9 ICD-9-CM: 131. 9 Thyroid mass     ICD-10-CM: E07.9 ICD-9-CM: 246. 9 Vitals BP Pulse Temp Weight(growth percentile) BMI OB Status 135/76 (BP 1 Location: Left arm, BP Patient Position: Sitting) 67 97.5 °F (36.4 °C) (Oral) 136 lb 3.2 oz (61.8 kg) 24.13 kg/m2 Postmenopausal  
 Smoking Status Never Smoker BMI and BSA Data Body Mass Index Body Surface Area  
 24.13 kg/m 2 1.66 m 2 Preferred Pharmacy Pharmacy Name Phone [address], [address] [phone] Your Updated Medication List  
  
   
This list is accurate as of: 11/2/17 10:26 AM.  Always use your most recent med list.  
  
  
  
  
 ascorbic acid (vitamin C) 500 mg tablet Commonly known as:  VITAMIN C Take  by mouth daily. aspirin 81 mg tablet Take 81 mg by mouth. B COMPLEX 1 tablet Generic drug:  b complex vitamins Take 1 Tab by mouth daily. carvedilol 25 mg tablet Commonly known as:  Harris Mirian Take 25 mg by mouth two (2) times daily (with meals). CHILDREN'S CLARITIN 5 mg Chew Generic drug:  Loratadine Take 1 Tab by mouth daily. levocetirizine 5 mg tablet Commonly known as:  Laure Hemp Take  by mouth daily. levothyroxine 50 mcg tablet Commonly known as:  SYNTHROID Take  by mouth Daily (before breakfast). metFORMIN 500 mg tablet Commonly known as:  GLUCOPHAGE  
two (2) times daily (with meals). multivitamin tablet Commonly known as:  ONE A DAY Take 1 Tab by mouth daily. nitroglycerin 0.4 mg SL tablet Commonly known as:  NITROSTAT  
by SubLINGual route as needed for Chest Pain.  
  
 pantoprazole 40 mg tablet Commonly known as:  PROTONIX Take 40 mg by mouth daily. PROBIOTIC PO Take  by mouth daily. quinapril 40 mg tablet Commonly known as:  ACCUPRIL Take 40 mg by mouth daily. simvastatin 40 mg tablet Commonly known as:  ZOCOR Take  by mouth nightly. VITAMIN D2 50,000 unit capsule Generic drug:  ergocalciferol Take 50,000 Units by mouth every fourteen (14) days. We Performed the Following COMPLETE CBC & AUTO DIFF WBC [10564 CPT(R)] Follow-up Instructions Return in about 3 months (around 2/2/2018). To-Do List   
 11/02/2017 Lab:  CBC WITH 3 PART DIFF Introducing [facility]! Dear Brit Chandra: 
Thank you for requesting a "[employer] account. Our records indicate that you already have an active "[employer] account. You can access your account anytime at [email or web address]. [employer]uling [employer] Did you know that you can access your hospital and ER discharge instructions at any time in "[employer]?   You can also review all of your test results from your hospital stay or ER visit. Additional Information If you have questions, please visit the Frequently Asked Questions section of the [employer] website at [email or web address] Remember, [employer] is NOT to be used for urgent needs. For medical emergencies, dial 911. Now available from your iPhone and Android! Please provide this summary of care documentation to your next provider. Your primary care clinician is listed as Adeola Gómez. If you have any questions after today's visit, please call [phone].

## 2017-11-03 LAB
ALBUMIN SERPL-MCNC: 4.5 G/DL (ref 3.5–4.8)
ALBUMIN/GLOB SERPL: 1.6 {RATIO} (ref 1.2–2.2)
ALP SERPL-CCNC: 72 IU/L (ref 39–117)
ALT SERPL-CCNC: 23 IU/L (ref 0–32)
AST SERPL-CCNC: 31 IU/L (ref 0–40)
BILIRUB SERPL-MCNC: 0.4 MG/DL (ref 0–1.2)
BUN SERPL-MCNC: 16 MG/DL (ref 8–27)
BUN/CREAT SERPL: 14 (ref 12–28)
CALCIUM SERPL-MCNC: 9.3 MG/DL (ref 8.7–10.3)
CHLORIDE SERPL-SCNC: 102 MMOL/L (ref 96–106)
CO2 SERPL-SCNC: 24 MMOL/L (ref 18–29)
CREAT SERPL-MCNC: 1.11 MG/DL (ref 0.57–1)
FERRITIN SERPL-MCNC: 79 NG/ML (ref 15–150)
GFR SERPLBLD CREATININE-BSD FMLA CKD-EPI: 49 ML/MIN/1.73
GFR SERPLBLD CREATININE-BSD FMLA CKD-EPI: 57 ML/MIN/1.73
GLOBULIN SER CALC-MCNC: 2.8 G/DL (ref 1.5–4.5)
GLUCOSE SERPL-MCNC: 113 MG/DL (ref 65–99)
IRON SATN MFR SERPL: 16 % (ref 15–55)
IRON SERPL-MCNC: 44 UG/DL (ref 27–139)
POTASSIUM SERPL-SCNC: 4.8 MMOL/L (ref 3.5–5.2)
PROT SERPL-MCNC: 7.3 G/DL (ref 6–8.5)
SODIUM SERPL-SCNC: 140 MMOL/L (ref 134–144)
THYROGLOB AB SERPL-ACNC: <1 IU/ML (ref 0–0.9)
TIBC SERPL-MCNC: 272 UG/DL (ref 250–450)
UIBC SERPL-MCNC: 228 UG/DL (ref 118–369)

## 2018-02-01 ENCOUNTER — HOSPITAL ENCOUNTER (OUTPATIENT)
Dept: ONCOLOGY | Age: 73
Discharge: HOME | End: 2018-02-01

## 2018-02-01 ENCOUNTER — OFFICE VISIT (OUTPATIENT)
Dept: ONCOLOGY | Age: 73
End: 2018-02-01

## 2018-02-01 VITALS
DIASTOLIC BLOOD PRESSURE: 72 MMHG | WEIGHT: 134 LBS | TEMPERATURE: 97.9 F | BODY MASS INDEX: 23.74 KG/M2 | HEART RATE: 78 BPM | SYSTOLIC BLOOD PRESSURE: 139 MMHG

## 2018-02-01 DIAGNOSIS — E07.9 THYROID MASS: ICD-10-CM

## 2018-02-01 DIAGNOSIS — D46.20 MDS (MYELODYSPLASTIC SYNDROME), LOW GRADE (HCC): ICD-10-CM

## 2018-02-01 DIAGNOSIS — D64.9 CHRONIC ANEMIA: ICD-10-CM

## 2018-02-01 DIAGNOSIS — D46.20 MDS (MYELODYSPLASTIC SYNDROME), LOW GRADE (HCC): Primary | ICD-10-CM

## 2018-02-01 LAB
BASO+EOS+MONOS # BLD AUTO: 0.5 K/UL (ref 0–2.3)
BASO+EOS+MONOS # BLD AUTO: 8 % (ref 0.1–17)
DIFFERENTIAL METHOD BLD: ABNORMAL
ERYTHROCYTE [DISTWIDTH] IN BLOOD BY AUTOMATED COUNT: 12.3 % (ref 11.5–14.5)
HCT VFR BLD AUTO: 29.7 % (ref 36–48)
HGB BLD-MCNC: 10 G/DL (ref 12–16)
LYMPHOCYTES # BLD: 1.6 K/UL (ref 1.1–5.9)
LYMPHOCYTES NFR BLD: 25 % (ref 14–44)
MCH RBC QN AUTO: 29.8 PG (ref 25–35)
MCHC RBC AUTO-ENTMCNC: 33.7 G/DL (ref 31–37)
MCV RBC AUTO: 88.4 FL (ref 78–102)
NEUTS SEG # BLD: 4.2 K/UL (ref 1.8–9.5)
NEUTS SEG NFR BLD: 67 % (ref 40–70)
PLATELET # BLD AUTO: 172 K/UL (ref 140–440)
RBC # BLD AUTO: 3.36 M/UL (ref 4.1–5.1)
WBC # BLD AUTO: 6.3 K/UL (ref 4.5–13)

## 2018-02-01 NOTE — PROGRESS NOTES
Hematology/Oncology  Progress Note    Name: Ric Bautista  Date: 2017  : 1945    PCP: Dr. Chasidy Burton    Ms. Roger Miller is a 67 y.o.  female with refractory anemia/early MDS. She had a bone marrow biopsy on 13 that revealed no evidence of hematopoetic neoplasm or dysplasia. She has present storage iron and normal female karyotype. Current Therapy: CBC Q3 months, EDUARDO therapy will be warranted if h/h drops below 10 and 30 respectively. Subjective:     Ms. Roger Miller is a 76-year-old woman with myelodysplastic syndrome and refractory anemia. The patient presents for routine 3 month follow up. The pt denies SOB, CP, unexplained fevers or unexplained weight changes. Her appetite and energy level is reasonably good. She states she has change her diet and eating more healthy foods. She continues to take her iron once daily. She does not have any question or issues at present. There are no new complaints.     Past Medical History:   Diagnosis Date    Allergic rhinitis     Anemia     Anemia NEC     Arthritis     Blurred vision     Cardiac defibrillator in place     Diabetes mellitus ([address]     Dyspepsia and other specified disorders of function of stomach     GERD (gastroesophageal reflux disease)     Heart attack     Heart disease     Hemorrhoid     Hyperlipemia     Hypertension     Ill-defined condition     Myeloblastic Syndrome    Joint pain     LBBB (left bundle branch block)     MDS (myelodysplastic syndrome) (HCC)     Pacemaker     Pacemaker     Sinus problem     SOB (shortness of breath)     Thyroid disorder     Vitamin D deficiency      Past Surgical History:   Procedure Laterality Date    HX CATARACT REMOVAL Bilateral     HX CHOLECYSTECTOMY      HX HYSTERECTOMY      HX PACEMAKER      [employer]    HX PACEMAKER PLACEMENT      HX THYROIDECTOMY       Social History     Social History    Marital status: UNKNOWN     Spouse name: N/A    Number of children: N/A    Years of education: N/A     Occupational History    Not on file. Social History Main Topics    Smoking status: Never Smoker    Smokeless tobacco: Never Used    Alcohol use No    Drug use: No    Sexual activity: Not on file     Other Topics Concern    Not on file     Social History Narrative     Family History   Problem Relation Age of Onset    Diabetes Mother     Diabetes Father     Cancer Father     Heart Disease Father     Hypertension Father     Diabetes Sister     Hypertension Sister     Stroke Sister     Diabetes Sister     Hypertension Sister      Current Outpatient Prescriptions   Medication Sig Dispense Refill    Loratadine (CHILDREN'S CLARITIN) 5 mg chew Take 1 Tab by mouth daily.  levocetirizine (XYZAL) 5 mg tablet Take  by mouth daily.  nitroglycerin (NITROSTAT) 0.4 mg SL tablet by SubLINGual route as needed for Chest Pain.  b complex vitamins (B COMPLEX 1) tablet Take 1 Tab by mouth daily.  LACTOBACILLUS ACIDOPHILUS (PROBIOTIC PO) Take  by mouth daily.  ascorbic acid, vitamin C, (VITAMIN C) 500 mg tablet Take  by mouth daily.  metFORMIN (GLUCOPHAGE) 500 mg tablet two (2) times daily (with meals).  levothyroxine (SYNTHROID) 50 mcg tablet Take  by mouth Daily (before breakfast).  ergocalciferol (VITAMIN D2) 50,000 unit capsule Take 50,000 Units by mouth every fourteen (14) days.  carvedilol (COREG) 25 mg tablet Take 25 mg by mouth two (2) times daily (with meals).  pantoprazole (PROTONIX) 40 mg tablet Take 40 mg by mouth daily.  aspirin 81 mg tablet Take 81 mg by mouth.  quinapril (ACCUPRIL) 40 mg tablet Take 40 mg by mouth daily.  simvastatin (ZOCOR) 40 mg tablet Take  by mouth nightly.  multivitamin (ONE A DAY) tablet Take 1 Tab by mouth daily. Review of Systems  Constitutional: The patient has no acute distress or discomfort.   HEENT: The patient denies recent head trauma, eye pain, blurred vision, hearing deficit, oropharyngeal mucosal pain or lesions, and the patient denies throat pain or discomfort. Lymphatics: The patient denies palpable peripheral lymphadenopathy. Hematologic: The patient denies having bruising, bleeding, or progressive fatigue. Respiratory: Patient denies having shortness of breath, cough, sputum production, fever, or dyspnea on exertion. Cardiovascular: The patient denies having leg pain, leg swelling, heart palpitations, chest permit, chest pain, or lightheadedness. The patient denies having dyspnea on exertion. Gastrointestinal: The patient denies having nausea, emesis, or diarrhea. The patient denies having any hematemesis or blood in the stool. Genitourinary: Patient denies having urinary urgency, frequency, or dysuria. The patient denies having blood in the urine. Psychological: The patient denies having symptoms of nervousness, anxiety, depression, or thoughts of harming himself some of this. Skin: Patient denies having skin rashes, skin, ulcerations, or unexplained itching or pruritus. Musculoskeletal: The patient denies having pain in the joints or bones. Objective:     Visit Vitals    /72    Pulse 78    Temp 97.9 °F (36.6 °C)    Wt 60.8 kg (134 lb)    BMI 23.74 kg/m2     ECOG 0 Pain score 0/10  Physical Exam:   Gen. Appearance: The patient is in no acute distress. Skin: no bruise or rashes  HEENT: The exam is unremarkable. Neck: Supple without lymphadenopathy or thyromegaly. Lungs: Clear to auscultation and percussion; there are no wheezes or rhonchi. Heart: Regular rate and rhythm; there are no murmurs, gallops, or rubs. Abdomen: Bowel sounds are present and normal.  There is no guarding, tenderness, or hepatosplenomegaly. Extremities: There is no clubbing, cyanosis, or edema. Neurologic: There are no focal neurologic deficits. Lymphatics: There is no palpable peripheral lymphadenopathy.     Lab data:      Results for orders placed or performed during the hospital encounter of 02/01/18   CBC WITH 3 PART DIFF     Status: Abnormal   Result Value Ref Range Status    WBC 6.3 4.5 - 13.0 K/uL Final    RBC 3.36 (L) 4.10 - 5.10 M/uL Final    HGB 10.0 (L) 12.0 - 16.0 g/dL Final    HCT 29.7 (L) 36 - 48 % Final    MCV 88.4 78 - 102 FL Final    MCH 29.8 25.0 - 35.0 PG Final    MCHC 33.7 31 - 37 g/dL Final    RDW 12.3 11.5 - 14.5 % Final    PLATELET 899 055 - 691 K/uL Final    NEUTROPHILS 67 40 - 70 % Final    MIXED CELLS 8 0.1 - 17 % Final    LYMPHOCYTES 25 14 - 44 % Final    ABS. NEUTROPHILS 4.2 1.8 - 9.5 K/UL Final    ABS. MIXED CELLS 0.5 0.0 - 2.3 K/uL Final    ABS. LYMPHOCYTES 1.6 1.1 - 5.9 K/UL Final     Comment: Test performed at [address]. Results Reviewed by Medical Director. DF AUTOMATED   Final           Assessment:     1. MDS (myelodysplastic syndrome), low grade ([facility] [address]    2. Chronic anemia    3. Thyroid mass          Plan:     MDS/refractory anemia: WBC is normal today at 6.3 k/uL We will continue to monitor the patient closely and should the differential WBC count on the CBC shows any evidence of atypical cells, she may need to undergo further evaluation to rule out any evidence that she is converting to a higher grade MDS or possible chronic myelomonocytic leukemia. Anemia:The hemoglobin and hematocrit remains stable at  to 10.0 g/dl and 29.7 % and platelets 284,015. I did inform her that should the hemoglobin and hematocrit decline below 10 and 30 respectively we will initiate EDUARDO  therapy. She will continue the iron tablets  twice daily. I will  check a CMP, iron panel and ferritin level today. Thyroid mass: Since her last clinic visit, the patient has undergone partial thyroidectomy with removal of a benign lesion in her thyroid. She does have a long-standing history of hypothyroidism and is currently on hormone replacement therapy.      We will see her for routine follow up in 3 months or sooner if indicated. Orders Placed This Encounter    COMPLETE CBC & AUTO DIFF WBC    InHouse CBC ([employer])     Standing Status:   Future     Number of Occurrences:   1     Standing Expiration Date:   2/8/2018    FERRITIN     Standing Status:   Future     Standing Expiration Date:   2/5/3849    METABOLIC PANEL, COMPREHENSIVE     Standing Status:   Future     Standing Expiration Date:   2/2/2019    IRON PROFILE     Standing Status:   Future     Standing Expiration Date:   2/2/2019       Dagoberto Oliveira MD  11/2/2017

## 2018-02-01 NOTE — MR AVS SNAPSHOT
[address] [place] [address] 
 
 
 640 [facility] [address] [id number] [phone] Patient: Carly Driver MRN: [id number] EOY:[birth year] Visit Information Date & Time Provider Department Dept. Phone Encounter #  
 2/1/2018 10:15 AM Ag Almanzar MD [facility] Office [phone] [id number] Follow-up Instructions Return in about 3 months (around 5/1/2018). Your Appointments 2/22/2018  1:00 PM  
Nurse Visit with DELTA, LAB  
[facility] Office ([facility]) Appt Note: LAB PER DR Karen Kang Q/ 3  [place] [address] [address] [address] [phone]  
  
   
 640 [facility] [address] [address]  
  
    
 5/3/2018 10:15 AM  
Office Visit with Ag Almanzar MD  
[facility] Office ([facility]) Appt Note: 3 MO  [place] [address] [address] [id number] [phone]  
  
   
 640 [facility] [address] [address] Upcoming Health Maintenance Date Due DTaP/Tdap/Td series (1 - Tdap) 10/4/1965 FOBT Q 1 YEAR AGE 50-75 10/4/1994 ZOSTER VACCINE AGE 60> 8/4/2004 GLAUCOMA SCREENING Q2Y 10/4/2009 Pneumococcal 65+ High/Highest Risk (1 of 2 - PCV13) 10/4/2009 MEDICARE YEARLY EXAM 10/4/2009 BREAST CANCER SCRN MAMMOGRAM 10/17/2019 Allergies as of 2/1/2018  Review Complete On: 2/1/2018 By: Ag Almanzar MD  
  
 Severity Noted Reaction Type Reactions Codeine  06/16/2016    Other (comments) Iodinated Contrast- Oral And Iv Dye  06/16/2016    Other (comments) Tramadol  06/16/2016    Other (comments) Current Immunizations  Never Reviewed No immunizations on file. Not reviewed this visit You Were Diagnosed With   
  
 Codes Comments MDS (myelodysplastic syndrome), low grade (HCC)    -  Primary ICD-10-CM: M92.89 ICD-9-CM: 238.72 Chronic anemia     ICD-10-CM: D64.9 ICD-9-CM: 285.9 Thyroid mass     ICD-10-CM: E07.9 ICD-9-CM: 246. 9 Vitals BP Pulse Temp Weight(growth percentile) BMI OB Status 139/72 78 97.9 °F (36.6 °C) 134 lb (60.8 kg) 23.74 kg/m2 Postmenopausal  
 Smoking Status Never Smoker BMI and BSA Data Body Mass Index Body Surface Area  
 23.74 kg/m 2 1.64 m 2 Preferred Pharmacy Pharmacy Name Phone [facility], [address] [phone] Your Updated Medication List  
  
   
This list is accurate as of: 2/1/18 10:48 AM.  Always use your most recent med list.  
  
  
  
  
 ascorbic acid (vitamin C) 500 mg tablet Commonly known as:  VITAMIN C Take  by mouth daily. aspirin 81 mg tablet Take 81 mg by mouth. B COMPLEX 1 tablet Generic drug:  b complex vitamins Take 1 Tab by mouth daily. carvedilol 25 mg tablet Commonly known as:  [place] [employer] Take 25 mg by mouth two (2) times daily (with meals). CHILDREN'S CLARITIN 5 mg Chew Generic drug:  Loratadine Take 1 Tab by mouth daily. levocetirizine 5 mg tablet Commonly known as:  [place] Take  by mouth daily. levothyroxine 50 mcg tablet Commonly known as:  SYNTHROID Take  by mouth Daily (before breakfast). metFORMIN 500 mg tablet Commonly known as:  GLUCOPHAGE  
two (2) times daily (with meals). multivitamin tablet Commonly known as:  ONE A DAY Take 1 Tab by mouth daily. nitroglycerin 0.4 mg SL tablet Commonly known as:  NITROSTAT  
by SubLINGual route as needed for Chest Pain.  
  
 pantoprazole 40 mg tablet Commonly known as:  PROTONIX Take 40 mg by mouth daily. PROBIOTIC PO Take  by mouth daily. quinapril 40 mg tablet Commonly known as:  ACCUPRIL Take 40 mg by mouth daily. simvastatin 40 mg tablet Commonly known as:  ZOCOR Take  by mouth nightly. VITAMIN D2 50,000 unit capsule Generic drug:  ergocalciferol Take 50,000 Units by mouth every fourteen (14) days. We Performed the Following COMPLETE CBC & AUTO DIFF WBC [12949 CPT(R)] FERRITIN [59258 CPT(R)] IRON PROFILE [id number] CPT(R)] METABOLIC PANEL, COMPREHENSIVE [38948 CPT(R)] Follow-up Instructions Return in about 3 months (around 5/1/2018). To-Do List   
 02/01/2018 Lab:  CBC WITH 3 PART DIFF Patient Instructions Anemia: Care Instructions Your Care Instructions Anemia is a low level of red blood cells, which carry oxygen throughout your body. Many things can cause anemia. Lack of iron is one of the most common causes. Your body needs iron to make hemoglobin, a substance in red blood cells that carries oxygen from the lungs to your body's cells. Without enough iron, the body produces fewer and smaller red blood cells. As a result, your body's cells do not get enough oxygen, and you feel tired and weak. And you may have trouble concentrating. Bleeding is the most common cause of a lack of iron. You may have heavy menstrual bleeding or bleeding caused by conditions such as ulcers, hemorrhoids, or cancer. Regular use of aspirin or other anti-inflammatory medicines (such as ibuprofen) also can cause bleeding in some people. A lack of iron in your diet also can cause anemia, especially at times when the body needs more iron, such as during pregnancy, infancy, and the teen years. Your doctor may have prescribed iron pills. It may take several months of treatment for your iron levels to return to normal. Your doctor also may suggest that you eat foods that are rich in iron, such as meat and beans. There are many other causes of anemia. It is not always due to a lack of iron. Finding the specific cause of your anemia will help your doctor find the right treatment for you. Follow-up care is a key part of your treatment and safety.  Be sure to make and go to all appointments, and call your doctor if you are having problems. It's also a good idea to know your test results and keep a list of the medicines you take. How can you care for yourself at home? · Take your medicines exactly as prescribed. Call your doctor if you think you are having a problem with your medicine. · If your doctor recommends iron pills, take them as directed: ¨ Try to take the pills on an empty stomach about 1 hour before or 2 hours after meals. But you may need to take iron with food to avoid an upset stomach. ¨ Do not take antacids or drink milk or caffeine drinks (such as coffee, tea, or cola) at the same time or within 2 hours of the time that you take your iron. They can make it hard for your body to absorb the iron. ¨ Vitamin C (from food or supplements) helps your body absorb iron. Try taking iron pills with a glass of orange juice or some other food that is high in vitamin C, such as citrus fruits. ¨ Iron pills may cause stomach problems, such as heartburn, nausea, diarrhea, constipation, and cramps. Be sure to drink plenty of fluids, and include fruits, vegetables, and fiber in your diet each day. Iron pills often make your bowel movements dark or green. ¨ If you forget to take an iron pill, do not take a double dose of iron the next time you take a pill. ¨ Keep iron pills out of the reach of small children. An overdose of iron can be very dangerous. · Follow your doctor's advice about eating iron-rich foods. These include red meat, shellfish, poultry, eggs, beans, raisins, whole-grain bread, and leafy green vegetables. · Steam vegetables to help them keep their iron content. When should you call for help? Call 911 anytime you think you may need emergency care. For example, call if: 
? · You have symptoms of a heart attack. These may include: ¨ Chest pain or pressure, or a strange feeling in the chest. 
¨ Sweating. ¨ Shortness of breath. ¨ Nausea or vomiting. ¨ Pain, pressure, or a strange feeling in the back, neck, jaw, or upper belly or in one or both shoulders or arms. ¨ Lightheadedness or sudden weakness. ¨ A fast or irregular heartbeat. After you call 911, the  may tell you to chew 1 adult-strength or 2 to 4 low-dose aspirin. Wait for an ambulance. Do not try to drive yourself. ? · You passed out (lost consciousness). ?Call your doctor now or seek immediate medical care if: 
? · You have new or increased shortness of breath. ? · You are dizzy or lightheaded, or you feel like you may faint. ? · Your fatigue and weakness continue or get worse. ? · You have any abnormal bleeding, such as: 
¨ Nosebleeds. ¨ Vaginal bleeding that is different (heavier, more frequent, at a different time of the month) than what you are used to. ¨ Bloody or black stools, or rectal bleeding. ¨ Bloody or pink urine. ? Watch closely for changes in your health, and be sure to contact your doctor if: 
? · You do not get better as expected. Where can you learn more? Go to http://bindu-apollo.info/. Enter R301 in the search box to learn more about \"Anemia: Care Instructions. \" Current as of: October 13, 2016 Content Version: 11.4 © 3067-5722 [employer]. Care instructions adapted under license by [employer] (which disclaims liability or warranty for this information). If you have questions about a medical condition or this instruction, always ask your healthcare professional. [place] [address] any warranty or liability for your use of this information. Introducing [facility]! Dear Serafin Loud: 
Thank you for requesting a [facility] [employer] account. Our records indicate that you already have an active [facility] account. You can access your account anytime at [email or web address]. [email or web address] Did you know that you can access your hospital and ER discharge instructions at any time in [employer]? You can also review all of your test results from your hospital stay or ER visit. Additional Information If you have questions, please visit the Frequently Asked Questions section of the [employer] website at [email or web address]/. Remember, [employer] is NOT to be used for urgent needs. For medical emergencies, dial 911. Now available from your iPhone and Android! Please provide this summary of care documentation to your next provider. Your primary care clinician is listed as Sanket Lindsey. If you have any questions after today's visit, please call [phone].

## 2018-02-01 NOTE — PATIENT INSTRUCTIONS
Anemia: Care Instructions  Your Care Instructions    Anemia is a low level of red blood cells, which carry oxygen throughout your body. Many things can cause anemia. Lack of iron is one of the most common causes. Your body needs iron to make hemoglobin, a substance in red blood cells that carries oxygen from the lungs to your body's cells. Without enough iron, the body produces fewer and smaller red blood cells. As a result, your body's cells do not get enough oxygen, and you feel tired and weak. And you may have trouble concentrating. Bleeding is the most common cause of a lack of iron. You may have heavy menstrual bleeding or bleeding caused by conditions such as ulcers, hemorrhoids, or cancer. Regular use of aspirin or other anti-inflammatory medicines (such as ibuprofen) also can cause bleeding in some people. A lack of iron in your diet also can cause anemia, especially at times when the body needs more iron, such as during pregnancy, infancy, and the teen years. Your doctor may have prescribed iron pills. It may take several months of treatment for your iron levels to return to normal. Your doctor also may suggest that you eat foods that are rich in iron, such as meat and beans. There are many other causes of anemia. It is not always due to a lack of iron. Finding the specific cause of your anemia will help your doctor find the right treatment for you. Follow-up care is a key part of your treatment and safety. Be sure to make and go to all appointments, and call your doctor if you are having problems. It's also a good idea to know your test results and keep a list of the medicines you take. How can you care for yourself at home? · Take your medicines exactly as prescribed. Call your doctor if you think you are having a problem with your medicine. · If your doctor recommends iron pills, take them as directed:  ¨ Try to take the pills on an empty stomach about 1 hour before or 2 hours after meals. But you may need to take iron with food to avoid an upset stomach. ¨ Do not take antacids or drink milk or caffeine drinks (such as coffee, tea, or cola) at the same time or within 2 hours of the time that you take your iron. They can make it hard for your body to absorb the iron. ¨ Vitamin C (from food or supplements) helps your body absorb iron. Try taking iron pills with a glass of orange juice or some other food that is high in vitamin C, such as citrus fruits. ¨ Iron pills may cause stomach problems, such as heartburn, nausea, diarrhea, constipation, and cramps. Be sure to drink plenty of fluids, and include fruits, vegetables, and fiber in your diet each day. Iron pills often make your bowel movements dark or green. ¨ If you forget to take an iron pill, do not take a double dose of iron the next time you take a pill. ¨ Keep iron pills out of the reach of small children. An overdose of iron can be very dangerous. · Follow your doctor's advice about eating iron-rich foods. These include red meat, shellfish, poultry, eggs, beans, raisins, whole-grain bread, and leafy green vegetables. · Steam vegetables to help them keep their iron content. When should you call for help? Call 911 anytime you think you may need emergency care. For example, call if:  ? · You have symptoms of a heart attack. These may include:  ¨ Chest pain or pressure, or a strange feeling in the chest.  ¨ Sweating. ¨ Shortness of breath. ¨ Nausea or vomiting. ¨ Pain, pressure, or a strange feeling in the back, neck, jaw, or upper belly or in one or both shoulders or arms. ¨ Lightheadedness or sudden weakness. ¨ A fast or irregular heartbeat. After you call 911, the  may tell you to chew 1 adult-strength or 2 to 4 low-dose aspirin. Wait for an ambulance. Do not try to drive yourself. ? · You passed out (lost consciousness).    ?Call your doctor now or seek immediate medical care if:  ? · You have new or increased shortness of breath. ? · You are dizzy or lightheaded, or you feel like you may faint. ? · Your fatigue and weakness continue or get worse. ? · You have any abnormal bleeding, such as:  ¨ Nosebleeds. ¨ Vaginal bleeding that is different (heavier, more frequent, at a different time of the month) than what you are used to. ¨ Bloody or black stools, or rectal bleeding. ¨ Bloody or pink urine. ? Watch closely for changes in your health, and be sure to contact your doctor if:  ? · You do not get better as expected. Where can you learn more? Go to http://bindu-apollo.info/. Enter R301 in the search box to learn more about \"Anemia: Care Instructions. \"  Current as of: October 13, 2016  Content Version: 11.4  © 9916-9596 [employer]. Care instructions adapted under license by [employer] (which disclaims liability or warranty for this information). If you have questions about a medical condition or this instruction, always ask your healthcare professional. [place] [address] any warranty or liability for your use of this information.

## 2018-02-02 LAB
ALBUMIN SERPL-MCNC: 4.4 G/DL (ref 3.5–4.8)
ALBUMIN/GLOB SERPL: 1.4 {RATIO} (ref 1.2–2.2)
ALP SERPL-CCNC: 90 IU/L (ref 39–117)
ALT SERPL-CCNC: 18 IU/L (ref 0–32)
AST SERPL-CCNC: 19 IU/L (ref 0–40)
BILIRUB SERPL-MCNC: 0.3 MG/DL (ref 0–1.2)
BUN SERPL-MCNC: 32 MG/DL (ref 8–27)
BUN/CREAT SERPL: 24 (ref 12–28)
CALCIUM SERPL-MCNC: 9.3 MG/DL (ref 8.7–10.3)
CHLORIDE SERPL-SCNC: 105 MMOL/L (ref 96–106)
CO2 SERPL-SCNC: 24 MMOL/L (ref 18–29)
CREAT SERPL-MCNC: 1.34 MG/DL (ref 0.57–1)
FERRITIN SERPL-MCNC: 96 NG/ML (ref 15–150)
GFR SERPLBLD CREATININE-BSD FMLA CKD-EPI: 39 ML/MIN/1.73
GFR SERPLBLD CREATININE-BSD FMLA CKD-EPI: 45 ML/MIN/1.73
GLOBULIN SER CALC-MCNC: 3.1 G/DL (ref 1.5–4.5)
GLUCOSE SERPL-MCNC: 154 MG/DL (ref 65–99)
IRON SATN MFR SERPL: 16 % (ref 15–55)
IRON SERPL-MCNC: 44 UG/DL (ref 27–139)
POTASSIUM SERPL-SCNC: 5 MMOL/L (ref 3.5–5.2)
PROT SERPL-MCNC: 7.5 G/DL (ref 6–8.5)
SODIUM SERPL-SCNC: 139 MMOL/L (ref 134–144)
TIBC SERPL-MCNC: 273 UG/DL (ref 250–450)
UIBC SERPL-MCNC: 229 UG/DL (ref 118–369)

## 2018-02-22 ENCOUNTER — CLINICAL SUPPORT (OUTPATIENT)
Dept: ONCOLOGY | Age: 73
End: 2018-02-22

## 2018-02-22 DIAGNOSIS — D46.20 MDS (MYELODYSPLASTIC SYNDROME), LOW GRADE (HCC): Primary | ICD-10-CM

## 2018-02-23 LAB
BASOPHILS # BLD AUTO: 0 X10E3/UL
BASOPHILS NFR BLD AUTO: 0 %
BUN SERPL-MCNC: 22 MG/DL
BUN/CREAT SERPL: 18
CALCIUM SERPL-MCNC: 9.6 MG/DL
CHLORIDE SERPL-SCNC: 101 MMOL/L (ref 96–106)
CO2 SERPL-SCNC: 24 MMOL/L
CREAT SERPL-MCNC: 1.2 MG/DL
EOSINOPHIL # BLD AUTO: 0.2 X10E3/UL
EOSINOPHIL NFR BLD AUTO: 3 %
ERYTHROCYTE [DISTWIDTH] IN BLOOD BY AUTOMATED COUNT: 13.9 %
GFR SERPLBLD CREATININE-BSD FMLA CKD-EPI: ABNORMAL ML/MIN/1.73
GFR SERPLBLD CREATININE-BSD FMLA CKD-EPI: ABNORMAL ML/MIN/1.73
GLUCOSE SERPL-MCNC: 208 MG/DL (ref 65–99)
HCT VFR BLD AUTO: 29.3 %
HGB BLD-MCNC: 9.7 G/DL
IMM GRANULOCYTES # BLD: 0 X10E3/UL
IMM GRANULOCYTES NFR BLD: 0 %
LYMPHOCYTES # BLD AUTO: 1.8 X10E3/UL
LYMPHOCYTES NFR BLD AUTO: 30 %
MCH RBC QN AUTO: 28.5 PG
MCHC RBC AUTO-ENTMCNC: 33.1 G/DL
MCV RBC AUTO: 86 FL
MONOCYTES # BLD AUTO: 0.4 X10E3/UL
MONOCYTES NFR BLD AUTO: 6 %
NEUTROPHILS # BLD AUTO: 3.6 X10E3/UL
NEUTROPHILS NFR BLD AUTO: 61 %
PLATELET # BLD AUTO: 192 X10E3/UL
POTASSIUM SERPL-SCNC: 4.7 MMOL/L
RBC # BLD AUTO: 3.4 X10E6/UL
SODIUM SERPL-SCNC: 140 MMOL/L (ref 134–144)
WBC # BLD AUTO: 5.9 X10E3/UL

## 2018-02-26 ENCOUNTER — TELEPHONE (OUTPATIENT)
Dept: ONCOLOGY | Age: 73
End: 2018-02-26

## 2018-02-26 NOTE — TELEPHONE ENCOUNTER
Spoke with patient regarding lab values. Procrit 60,000 units SQ every 3 weeks for H/H less than 10/30 will be ordered and submitted for insurance authorization. Once approved, the patient will be contacted to begin treatment. Discussed sides effects, risks, and benefits associated with treatment.

## 2018-02-26 NOTE — TELEPHONE ENCOUNTER
Can you please call patient regarding her labs that were done 2/22/2018. She is scheduled to see Dr. Rohit Crespo in May. She states she received her labs under my chart and she has questions. Please call.

## 2018-02-28 ENCOUNTER — HOSPITAL ENCOUNTER (OUTPATIENT)
Dept: ONCOLOGY | Age: 73
Discharge: HOME | End: 2018-02-28

## 2018-02-28 ENCOUNTER — HOSPITAL ENCOUNTER (OUTPATIENT)
Dept: INFUSION THERAPY | Age: 73
Discharge: HOME | End: 2018-02-28
Payer: MEDICARE

## 2018-02-28 ENCOUNTER — CLINICAL SUPPORT (OUTPATIENT)
Dept: ONCOLOGY | Age: 73
End: 2018-02-28

## 2018-02-28 VITALS
TEMPERATURE: 97 F | RESPIRATION RATE: 16 BRPM | WEIGHT: 135 LBS | DIASTOLIC BLOOD PRESSURE: 76 MMHG | HEIGHT: 64 IN | BODY MASS INDEX: 23.05 KG/M2 | HEART RATE: 73 BPM | SYSTOLIC BLOOD PRESSURE: 139 MMHG

## 2018-02-28 DIAGNOSIS — D46.20 LOW GRADE MYELODYSPLASTIC SYNDROME LESIONS (HCC): Primary | ICD-10-CM

## 2018-02-28 DIAGNOSIS — D46.20 LOW GRADE MYELODYSPLASTIC SYNDROME LESIONS (HCC): ICD-10-CM

## 2018-02-28 LAB
BASO+EOS+MONOS # BLD AUTO: 0.5 K/UL (ref 0–2.3)
BASO+EOS+MONOS # BLD AUTO: 8 % (ref 0.1–17)
DIFFERENTIAL METHOD BLD: ABNORMAL
ERYTHROCYTE [DISTWIDTH] IN BLOOD BY AUTOMATED COUNT: 12.6 % (ref 11.5–14.5)
HCT VFR BLD AUTO: 30 % (ref 36–48)
HGB BLD-MCNC: 10.1 G/DL (ref 12–16)
LYMPHOCYTES # BLD: 1.7 K/UL (ref 1.1–5.9)
LYMPHOCYTES NFR BLD: 28 % (ref 14–44)
MCH RBC QN AUTO: 29.6 PG (ref 25–35)
MCHC RBC AUTO-ENTMCNC: 33.7 G/DL (ref 31–37)
MCV RBC AUTO: 88 FL (ref 78–102)
NEUTS SEG # BLD: 3.7 K/UL (ref 1.8–9.5)
NEUTS SEG NFR BLD: 64 % (ref 40–70)
PLATELET # BLD AUTO: 176 K/UL (ref 140–440)
RBC # BLD AUTO: 3.41 M/UL (ref 4.1–5.1)
WBC # BLD AUTO: 5.9 K/UL (ref 4.5–13)

## 2018-02-28 PROCEDURE — 36415 COLL VENOUS BLD VENIPUNCTURE: CPT

## 2018-02-28 RX ORDER — GLIMEPIRIDE 1 MG/1
1 TABLET ORAL
COMMUNITY

## 2018-02-28 NOTE — PROGRESS NOTES
[facility] Progress Note    Date: 2018    Name: Nabor Rivero    MRN: [id number]         : 1945      Ms. Linda Curry arrived in the [facility] today, at 1300, in stable condition, here for Q 3 Week, CBC/Procrit injection. She was assessed and education was provided. Upon assessment today, it was noted that Ms. Linda Curry complained of a constant \"chest heaviness\" which she stated she had experienced now, for a little over 3 weeks. She also stated that she does have a history of \"cardiac problems\", and stated that she has a pacemaker. She also stated that she had informed her endocrinologist about the chest heaviness, and he seemed to think it was related to her thyroid condition, but that she had not informed her cardiologist of her symptoms. She was very strongly encouraged to contact her cardiologist  Immediately upon her leaving the [facility] today, about her symptoms, and was also instructed to report to the ED, if her symptoms worsened. She verbalized understanding, and stated that she would. Ms. Roscoe German vitals were reviewed. Visit Vitals    /76 (BP 1 Location: Left arm, BP Patient Position: At rest;Sitting)    Pulse 73    Temp 97 °F (36.1 °C)    Resp 16    Ht 5' 4\" (1.626 m)    Wt 61.2 kg (135 lb)    Breastfeeding No    BMI 23.17 kg/m2           CBC was drawn from her left AC at 1323, per order, and without incident. Lab results were obtained and reviewed. Recent Results (from the past 12 hour(s))   CBC WITH 3 PART DIFF    Collection Time: 18  1:23 PM   Result Value Ref Range    WBC 5.9 4.5 - 13.0 K/uL    RBC 3.41 (L) 4.10 - 5.10 M/uL    HGB 10.1 (L) 12.0 - 16.0 g/dL    HCT 30.0 (L) 36 - 48 %    MCV 88.0 78 - 102 FL    MCH 29.6 25.0 - 35.0 PG    MCHC 33.7 31 - 37 g/dL    RDW 12.6 11.5 - 14.5 %    PLATELET 037 401 - 977 K/uL    NEUTROPHILS 64 40 - 70 %    MIXED CELLS 8 0.1 - 17 %    LYMPHOCYTES 28 14 - 44 %    ABS. NEUTROPHILS 3.7 1.8 - 9.5 K/UL    ABS.  MIXED CELLS 0.5 0.0 - 2.3 K/uL    ABS. LYMPHOCYTES 1.7 1.1 - 5.9 K/UL    DF AUTOMATED             Procrit injection was HELD today, per order, for HGB 10.1 & HCT 30.0. Also, No Authorization for Procrit injection yet, per Belén Hawkins in the authorization department. Ms. Zonia Mercado tolerated well, and had no complaints. Ms. Zonia Mercado was discharged from [place] [address] in stable condition at 1340. Isaac Millan She is to return in 3 weeks, on Wednesday, 3-21-18,  at 1300,  for her next appointment, for CBC/Procrit injection.      Kathleen Painter RN  February 28, 2018  1:20 PM

## 2018-03-14 ENCOUNTER — TELEPHONE (OUTPATIENT)
Dept: ONCOLOGY | Age: 73
End: 2018-03-14

## 2018-03-14 ENCOUNTER — DOCUMENTATION ONLY (OUTPATIENT)
Dept: ONCOLOGY | Age: 73
End: 2018-03-14

## 2018-03-21 ENCOUNTER — HOSPITAL ENCOUNTER (OUTPATIENT)
Dept: ONCOLOGY | Age: 73
Discharge: HOME | End: 2018-03-21

## 2018-03-21 ENCOUNTER — CLINICAL SUPPORT (OUTPATIENT)
Dept: ONCOLOGY | Age: 73
End: 2018-03-21

## 2018-03-21 ENCOUNTER — HOSPITAL ENCOUNTER (OUTPATIENT)
Dept: INFUSION THERAPY | Age: 73
Discharge: HOME | End: 2018-03-21
Payer: MEDICARE

## 2018-03-21 VITALS
SYSTOLIC BLOOD PRESSURE: 147 MMHG | HEART RATE: 69 BPM | TEMPERATURE: 98 F | RESPIRATION RATE: 16 BRPM | DIASTOLIC BLOOD PRESSURE: 78 MMHG

## 2018-03-21 DIAGNOSIS — D46.20 LOW GRADE MYELODYSPLASTIC SYNDROME LESIONS (HCC): ICD-10-CM

## 2018-03-21 DIAGNOSIS — D46.20 LOW GRADE MYELODYSPLASTIC SYNDROME LESIONS (HCC): Primary | ICD-10-CM

## 2018-03-21 LAB
BASO+EOS+MONOS # BLD AUTO: 0.6 K/UL (ref 0–2.3)
BASO+EOS+MONOS # BLD AUTO: 8 % (ref 0.1–17)
DIFFERENTIAL METHOD BLD: ABNORMAL
ERYTHROCYTE [DISTWIDTH] IN BLOOD BY AUTOMATED COUNT: 12.5 % (ref 11.5–14.5)
HCT VFR BLD AUTO: 31.2 % (ref 36–48)
HGB BLD-MCNC: 10.5 G/DL (ref 12–16)
LYMPHOCYTES # BLD: 1.8 K/UL (ref 1.1–5.9)
LYMPHOCYTES NFR BLD: 25 % (ref 14–44)
MCH RBC QN AUTO: 29.5 PG (ref 25–35)
MCHC RBC AUTO-ENTMCNC: 33.7 G/DL (ref 31–37)
MCV RBC AUTO: 87.6 FL (ref 78–102)
NEUTS SEG # BLD: 4.5 K/UL (ref 1.8–9.5)
NEUTS SEG NFR BLD: 66 % (ref 40–70)
PLATELET # BLD AUTO: 188 K/UL (ref 140–440)
RBC # BLD AUTO: 3.56 M/UL (ref 4.1–5.1)
WBC # BLD AUTO: 6.9 K/UL (ref 4.5–13)

## 2018-03-21 PROCEDURE — 36415 COLL VENOUS BLD VENIPUNCTURE: CPT

## 2018-03-21 NOTE — PROGRESS NOTES
[facility] Progress Note    Date: 2018    Name: Shahbaz García    MRN: [id number]         : 1945      Ms. Sandra Zimmer arrived in the [facility] today at 1310, in stable condition, here for Q 3 Week, CBC/Procrit injection. She was assessed and education was provided. Ms. Melly Cruz vitals were reviewed. Visit Vitals    /78 (BP 1 Location: Right arm, BP Patient Position: At rest;Sitting)    Pulse 69    Temp 98 °F (36.7 °C)    Resp 16    Breastfeeding No         Blood for a CBC was drawn from her left AC at 1324, per order, and without incident. Lab results were obtained and reviewed. Recent Results (from the past 12 hour(s))   CBC WITH 3 PART DIFF    Collection Time: 18  1:24 PM   Result Value Ref Range    WBC 6.9 4.5 - 13.0 K/uL    RBC 3.56 (L) 4.10 - 5.10 M/uL    HGB 10.5 (L) 12.0 - 16.0 g/dL    HCT 31.2 (L) 36 - 48 %    MCV 87.6 78 - 102 FL    MCH 29.5 25.0 - 35.0 PG    MCHC 33.7 31 - 37 g/dL    RDW 12.5 11.5 - 14.5 %    PLATELET 877 013 - 870 K/uL    NEUTROPHILS 66 40 - 70 %    MIXED CELLS 8 0.1 - 17 %    LYMPHOCYTES 25 14 - 44 %    ABS. NEUTROPHILS 4.5 1.8 - 9.5 K/UL    ABS. MIXED CELLS 0.6 0.0 - 2.3 K/uL    ABS. LYMPHOCYTES 1.8 1.1 - 5.9 K/UL    DF AUTOMATED             Procrit injection was HELD today, per order, for HGB 10.5 & HCT 31.2, and because her insurance company has  denied authorization for the Procrit injection at this time. Ms. Sandra Zimmer tolerated well, and had no complaints. Ms. Sandra Zimmer was discharged from [place] [address] in stable condition at [phone]. Ashley Hind She is to return in 3 weeks, on Wednesday, 18,  at 1100,  for her next appointment, for CBC & possible Procrit injection.      Lisa Ramirez, MAY  2018  1:39 PM

## 2018-04-11 ENCOUNTER — HOSPITAL ENCOUNTER (OUTPATIENT)
Dept: ONCOLOGY | Age: 73
Discharge: HOME | End: 2018-04-11

## 2018-04-11 ENCOUNTER — HOSPITAL ENCOUNTER (OUTPATIENT)
Dept: INFUSION THERAPY | Age: 73
Discharge: HOME | End: 2018-04-11
Payer: MEDICARE

## 2018-04-11 ENCOUNTER — CLINICAL SUPPORT (OUTPATIENT)
Dept: ONCOLOGY | Age: 73
End: 2018-04-11

## 2018-04-11 VITALS
HEART RATE: 75 BPM | SYSTOLIC BLOOD PRESSURE: 104 MMHG | RESPIRATION RATE: 16 BRPM | DIASTOLIC BLOOD PRESSURE: 58 MMHG | TEMPERATURE: 98.1 F

## 2018-04-11 DIAGNOSIS — D46.9 MDS (MYELODYSPLASTIC SYNDROME) (HCC): Primary | ICD-10-CM

## 2018-04-11 DIAGNOSIS — D46.9 MDS (MYELODYSPLASTIC SYNDROME) (HCC): ICD-10-CM

## 2018-04-11 LAB
BASO+EOS+MONOS # BLD AUTO: 0.6 K/UL (ref 0–2.3)
BASO+EOS+MONOS # BLD AUTO: 9 % (ref 0.1–17)
DIFFERENTIAL METHOD BLD: ABNORMAL
ERYTHROCYTE [DISTWIDTH] IN BLOOD BY AUTOMATED COUNT: 12.5 % (ref 11.5–14.5)
HCT VFR BLD AUTO: 31.8 % (ref 36–48)
HGB BLD-MCNC: 10.7 G/DL (ref 12–16)
LYMPHOCYTES # BLD: 1.4 K/UL (ref 1.1–5.9)
LYMPHOCYTES NFR BLD: 20 % (ref 14–44)
MCH RBC QN AUTO: 29.9 PG (ref 25–35)
MCHC RBC AUTO-ENTMCNC: 33.6 G/DL (ref 31–37)
MCV RBC AUTO: 88.8 FL (ref 78–102)
NEUTS SEG # BLD: 5.2 K/UL (ref 1.8–9.5)
NEUTS SEG NFR BLD: 71 % (ref 40–70)
PLATELET # BLD AUTO: 178 K/UL (ref 140–440)
RBC # BLD AUTO: 3.58 M/UL (ref 4.1–5.1)
WBC # BLD AUTO: 7.2 K/UL (ref 4.5–13)

## 2018-04-11 PROCEDURE — 36415 COLL VENOUS BLD VENIPUNCTURE: CPT

## 2018-04-11 NOTE — PROGRESS NOTES
[facility] Progress Note    Date: 2018    Name: Lisa Masters    MRN: [id number]         : 1945      Ms. Kapil Pineda was assessed and education was provided. Ms. Kurtis Aquino vitals were reviewed and patient was observed for 5 minutes prior to treatment. Visit Vitals    /58 (BP 1 Location: Left arm, BP Patient Position: At rest;Sitting)    Pulse 75    Temp 98.1 °F (36.7 °C)    Resp 16    Breastfeeding No       Lab results were obtained and reviewed. Recent Results (from the past 12 hour(s))   CBC WITH 3 PART DIFF    Collection Time: 18 10:58 AM   Result Value Ref Range    WBC 7.2 4.5 - 13.0 K/uL    RBC 3.58 (L) 4.10 - 5.10 M/uL    HGB 10.7 (L) 12.0 - 16.0 g/dL    HCT 31.8 (L) 36 - 48 %    MCV 88.8 78 - 102 FL    MCH 29.9 25.0 - 35.0 PG    MCHC 33.6 31 - 37 g/dL    RDW 12.5 11.5 - 14.5 %    PLATELET 681 309 - 070 K/uL    NEUTROPHILS 71 (H) 40 - 70 %    MIXED CELLS 9 0.1 - 17 %    LYMPHOCYTES 20 14 - 44 %    ABS. NEUTROPHILS 5.2 1.8 - 9.5 K/UL    ABS. MIXED CELLS 0.6 0.0 - 2.3 K/uL    ABS. LYMPHOCYTES 1.4 1.1 - 5.9 K/UL    DF AUTOMATED         H&H = 10.7/31.8. Procrit not indicated. Patient armband removed and shredded. Ms. Kapil Pineda was discharged from [place] [address] in stable condition at 1120. She is to return on 18 at 1100 for her next appointment for CBC/no auth for Procrit Q 3 wks.     Gael Mercer RN  2018  11:25 AM

## 2018-05-02 ENCOUNTER — APPOINTMENT (OUTPATIENT)
Dept: INFUSION THERAPY | Age: 73
End: 2018-05-02

## 2018-05-03 ENCOUNTER — HOSPITAL ENCOUNTER (OUTPATIENT)
Dept: INFUSION THERAPY | Age: 73
Discharge: HOME | End: 2018-05-03

## 2018-05-03 ENCOUNTER — OFFICE VISIT (OUTPATIENT)
Dept: ONCOLOGY | Age: 73
End: 2018-05-03

## 2018-05-03 ENCOUNTER — HOSPITAL ENCOUNTER (OUTPATIENT)
Dept: ONCOLOGY | Age: 73
Discharge: HOME | End: 2018-05-03

## 2018-05-03 VITALS
SYSTOLIC BLOOD PRESSURE: 160 MMHG | DIASTOLIC BLOOD PRESSURE: 82 MMHG | TEMPERATURE: 97.9 F | BODY MASS INDEX: 23.1 KG/M2 | WEIGHT: 134.6 LBS | HEART RATE: 69 BPM

## 2018-05-03 DIAGNOSIS — E07.9 THYROID MASS: ICD-10-CM

## 2018-05-03 DIAGNOSIS — D46.20 MDS (MYELODYSPLASTIC SYNDROME), LOW GRADE (HCC): ICD-10-CM

## 2018-05-03 DIAGNOSIS — D46.20 MDS (MYELODYSPLASTIC SYNDROME), LOW GRADE (HCC): Primary | ICD-10-CM

## 2018-05-03 DIAGNOSIS — D64.9 CHRONIC ANEMIA: ICD-10-CM

## 2018-05-03 LAB
BASO+EOS+MONOS # BLD AUTO: 0.5 K/UL (ref 0–2.3)
BASO+EOS+MONOS # BLD AUTO: 9 % (ref 0.1–17)
DIFFERENTIAL METHOD BLD: ABNORMAL
ERYTHROCYTE [DISTWIDTH] IN BLOOD BY AUTOMATED COUNT: 12.3 % (ref 11.5–14.5)
HCT VFR BLD AUTO: 31.4 % (ref 36–48)
HGB BLD-MCNC: 10.6 G/DL (ref 12–16)
LYMPHOCYTES # BLD: 1.4 K/UL (ref 1.1–5.9)
LYMPHOCYTES NFR BLD: 25 % (ref 14–44)
MCH RBC QN AUTO: 29.9 PG (ref 25–35)
MCHC RBC AUTO-ENTMCNC: 33.8 G/DL (ref 31–37)
MCV RBC AUTO: 88.5 FL (ref 78–102)
NEUTS SEG # BLD: 3.8 K/UL (ref 1.8–9.5)
NEUTS SEG NFR BLD: 66 % (ref 40–70)
PLATELET # BLD AUTO: 227 K/UL (ref 140–440)
RBC # BLD AUTO: 3.55 M/UL (ref 4.1–5.1)
WBC # BLD AUTO: 5.7 K/UL (ref 4.5–13)

## 2018-05-03 NOTE — PROGRESS NOTES
Patient did not require visit in [facility] today, Ov labs completed by lab staff. Patient's next appt is 5/23/18 at 1300.

## 2018-05-03 NOTE — PATIENT INSTRUCTIONS
Myelodysplastic Syndromes: Care Instructions  Your Care Instructions  Myelodysplastic syndromes, also called MDS, are a group of rare conditions in which the bone marrow does not make enough healthy blood cells. Normally, the bone marrow makes red blood cells, white blood cells, and platelets. These cells carry oxygen in the blood, help the body fight infections, and help the blood clot. With MDS, you may feel weak and tired, get infections often, and bruise easily, although symptoms tend to vary. MDS is a form of blood cancer. In some cases, MDS can turn into acute myeloid leukemia (AML), another type of cancer. Some people develop MDS after treatment for cancer or exposure to pesticides or other chemicals. But in most cases, the cause of MDS is not known. Your doctor will use the results of blood tests to guide your treatment. There are many types of MDS, with different treatment plans for each. If you have enough red blood cells and are feeling all right, you may not need active treatment, but you and your doctor will want to watch your condition carefully. If you start feeling lightheaded and have no energy, you may need a blood transfusion. Your doctor also may give you antibiotics to prevent or treat infection. Follow-up care is a key part of your treatment and safety. Be sure to make and go to all appointments, and call your doctor if you are having problems. It's also a good idea to know your test results and keep a list of the medicines you take. How can you care for yourself at home? · Take your medicines exactly as prescribed. Call your doctor if you think you are having a problem with your medicine. You will get more details on the specific medicines your doctor prescribes. · If your doctor prescribed antibiotics, take them as directed. Do not stop taking them just because you feel better. You need to take the full course of antibiotics.  If you have side effects from antibiotics, tell your doctor. · Take steps to control your stress and workload. Learn relaxation techniques. ¨ Share your feelings. Stress and tension affect our emotions. By expressing your feelings to others, you may be able to understand and cope with them. ¨ Consider joining a support group. Talking about a problem with your spouse, a good friend, or other people with similar problems is a good way to reduce tension and stress. ¨ Express yourself with art. Try writing, crafts, dance, or art to relieve stress. ¨ Be kind to your body and mind. Getting enough sleep, eating a healthy diet, and taking time to do things you enjoy can contribute to an overall feeling of balance in your life and help reduce stress. ¨ Get help if you need it. Discuss your concerns with your doctor or counselor. · Do not smoke. Smoking can make blood problems worse. If you need help quitting, talk to your doctor about stop-smoking programs and medicines. These can increase your chances of quitting for good. · If you have not already done so, prepare a list of advance directives. Advance directives are instructions to your doctor and family members about what kind of care you want if you become unable to speak or express yourself. · Call the [employer] (4-483.449.9876) or visit its website at 0185 awe.sm for more information. When should you call for help? Call 911 anytime you think you may need emergency care. For example, call if:  ? · You passed out (lost consciousness). ?Call your doctor now or seek immediate medical care if:  ? · You have a fever. ? · You have abnormal bleeding. ? · You have new or worse pain. ? · You think you have an infection. ? · You have new symptoms, such as a cough, belly pain, vomiting, diarrhea, or a rash. ? Watch closely for changes in your health, and be sure to contact your doctor if:  ? · You are much more tired than usual.   ? · You have swollen glands in your armpits, groin, or neck.    ? · You do not get better as expected. Where can you learn more? Go to http://bindu-apollo.info/. Enter Brendon El in the search box to learn more about \"Myelodysplastic Syndromes: Care Instructions. \"  Current as of: May 12, 2017  Content Version: 11.4  © 9101-1264 [employer]. Care instructions adapted under license by [employer] (which disclaims liability or warranty for this information). If you have questions about a medical condition or this instruction, always ask your healthcare professional. [address] any warranty or liability for your use of this information.

## 2018-05-03 NOTE — PROGRESS NOTES
Hematology/Oncology  Progress Note    Name: Urszula Dan  Date:   : 1945    PCP: Dr. Radha Hall    Ms. Liv Kamara is a 67 y.o.  female with refractory anemia/early MDS. She had a bone marrow biopsy on 13 that revealed no evidence of hematopoetic neoplasm or dysplasia. She has present storage iron and normal female karyotype. Current Therapy: CBC Q3 months, EDUARDO therapy will be warranted if h/h drops below 10 and 30 respectively. Subjective:     Ms. Liv Kamara is a 69-year-old woman with myelodysplastic syndrome and refractory anemia. The patient presents for routine 3 month follow up. The pt denies SOB, CP, unexplained fevers or unexplained weight changes. Her appetite and energy level is reasonably good. She states she has change her diet and eating more healthy foods. She continues to take her iron once daily. She reports that she has no new questions or concerns to report.      Past Medical History:   Diagnosis Date    Allergic rhinitis     Anemia     Anemia 2018    Anemia NEC     Arthritis     Blurred vision     Cardiac defibrillator in place     Diabetes mellitus ([address]     Dyspepsia and other specified disorders of function of stomach     GERD (gastroesophageal reflux disease)     Heart attack ([address]     Heart disease     Hemorrhoid     Hyperlipemia     Hypertension     Ill-defined condition     Myeloblastic Syndrome    Joint pain     LBBB (left bundle branch block)     MDS (myelodysplastic syndrome) (HCC)     Myelodysplastic syndrome, low grade ([address] 2018    Pacemaker     Pacemaker     Sinus problem     SOB (shortness of breath)     Thyroid disorder     Vitamin D deficiency      Past Surgical History:   Procedure Laterality Date    HX CATARACT REMOVAL Bilateral     HX CHOLECYSTECTOMY      HX HYSTERECTOMY      HX PACEMAKER      [employer]    HX PACEMAKER PLACEMENT      HX THYROIDECTOMY       Social History     Social History    Marital status:  Spouse name: N/A    Number of children: N/A    Years of education: N/A     Occupational History    Not on file. Social History Main Topics    Smoking status: Never Smoker    Smokeless tobacco: Never Used    Alcohol use No    Drug use: No    Sexual activity: Not on file     Other Topics Concern    Not on file     Social History Narrative     Family History   Problem Relation Age of Onset    Diabetes Mother     Diabetes Father     Cancer Father     Heart Disease Father     Hypertension Father     Diabetes Sister     Hypertension Sister     Stroke Sister     Diabetes Sister     Hypertension Sister      Current Outpatient Prescriptions   Medication Sig Dispense Refill    glimepiride (AMARYL) 1 mg tablet Take 1 mg by mouth Daily (before breakfast).  Loratadine (CHILDREN'S CLARITIN) 5 mg chew Take 1 Tab by mouth daily.  levocetirizine (XYZAL) 5 mg tablet Take  by mouth daily.  nitroglycerin (NITROSTAT) 0.4 mg SL tablet by SubLINGual route as needed for Chest Pain.  LACTOBACILLUS ACIDOPHILUS (PROBIOTIC PO) Take  by mouth daily.  ascorbic acid, vitamin C, (VITAMIN C) 500 mg tablet Take  by mouth daily.  metFORMIN (GLUCOPHAGE) 500 mg tablet 250 mg daily.  levothyroxine (SYNTHROID) 50 mcg tablet Take  by mouth Daily (before breakfast).  ergocalciferol (VITAMIN D2) 50,000 unit capsule Take 50,000 Units by mouth every fourteen (14) days.  carvedilol (COREG) 25 mg tablet Take 25 mg by mouth two (2) times daily (with meals).  pantoprazole (PROTONIX) 40 mg tablet Take 40 mg by mouth daily.  aspirin 81 mg tablet Take 81 mg by mouth.  quinapril (ACCUPRIL) 40 mg tablet Take 40 mg by mouth daily.  simvastatin (ZOCOR) 40 mg tablet Take  by mouth nightly.  multivitamin (ONE A DAY) tablet Take 1 Tab by mouth daily. Review of Systems  Constitutional: The patient has no acute distress or discomfort.   HEENT: The patient denies recent head trauma, eye pain, blurred vision,  hearing deficit, oropharyngeal mucosal pain or lesions, and the patient denies throat pain or discomfort. Lymphatics: The patient denies palpable peripheral lymphadenopathy. Hematologic: The patient denies having bruising, bleeding, or progressive fatigue. Respiratory: Patient denies having shortness of breath, cough, sputum production, fever, or dyspnea on exertion. Cardiovascular: The patient denies having leg pain, leg swelling, heart palpitations, chest permit, chest pain, or lightheadedness. The patient denies having dyspnea on exertion. Gastrointestinal: The patient denies having nausea, emesis, or diarrhea. The patient denies having any hematemesis or blood in the stool. Genitourinary: Patient denies having urinary urgency, frequency, or dysuria. The patient denies having blood in the urine. Psychological: The patient denies having symptoms of nervousness, anxiety, depression, or thoughts of harming himself some of this. Skin: Patient denies having skin rashes, skin, ulcerations, or unexplained itching or pruritus. Musculoskeletal: The patient denies having pain in the joints or bones. Objective:     Visit Vitals    /82    Pulse 69    Temp 97.9 °F (36.6 °C) (Oral)    Wt 61.1 kg (134 lb 9.6 oz)    BMI 23.1 kg/m2     ECOG 0 Pain score 0/10  Physical Exam:   Gen. Appearance: The patient is in no acute distress. Skin: no bruise or rashes  HEENT: The exam is unremarkable. Neck: Supple without lymphadenopathy or thyromegaly. Lungs: Clear to auscultation and percussion; there are no wheezes or rhonchi. Heart: Regular rate and rhythm; there are no murmurs, gallops, or rubs. Abdomen: Bowel sounds are present and normal.  There is no guarding, tenderness, or hepatosplenomegaly. Extremities: There is no clubbing, cyanosis, or edema. Neurologic: There are no focal neurologic deficits. Lymphatics:  There is no palpable peripheral lymphadenopathy. Lab data:      Results for orders placed or performed during the hospital encounter of 05/03/18   CBC WITH 3 PART DIFF     Status: Abnormal   Result Value Ref Range Status    WBC 5.7 4.5 - 13.0 K/uL Final    RBC 3.55 (L) 4.10 - 5.10 M/uL Final    HGB 10.6 (L) 12.0 - 16.0 g/dL Final    HCT 31.4 (L) 36 - 48 % Final    MCV 88.5 78 - 102 FL Final    MCH 29.9 25.0 - 35.0 PG Final    MCHC 33.8 31 - 37 g/dL Final    RDW 12.3 11.5 - 14.5 % Final    PLATELET 639 174 - 978 K/uL Final    NEUTROPHILS 66 40 - 70 % Final    MIXED CELLS 9 0.1 - 17 % Final    LYMPHOCYTES 25 14 - 44 % Final    ABS. NEUTROPHILS 3.8 1.8 - 9.5 K/UL Final    ABS. MIXED CELLS 0.5 0.0 - 2.3 K/uL Final    ABS. LYMPHOCYTES 1.4 1.1 - 5.9 K/UL Final     Comment: Test performed at [address]. Results Reviewed by Medical Director. DF AUTOMATED   Final           Assessment:     1. MDS (myelodysplastic syndrome), low grade ([facility] [address]    2. Chronic anemia    3. Thyroid mass          Plan:     MDS/refractory anemia: WBC is normal today at 0.7 k/uL We will continue to monitor the patient closely and should the differential WBC count on the CBC shows any evidence of atypical cells, she may need to undergo further evaluation to rule out any evidence that she is converting to a higher grade MDS or possible chronic myelomonocytic leukemia. Anemia:The hemoglobin and hematocrit remains stable at  to 0.6 g/dL and 31.4 % recklessly with a platelet count of 834,919                                                                                                                                                                                                                                                           . I did inform her that should the hemoglobin and hematocrit decline below 10 and 30 respectively we will initiate EDUARDO  therapy. She will continue the iron tablets  twice daily.  I will  check a CMP, iron panel and ferritin level today. Thyroid mass: Since her last clinic visit, the patient has undergone partial thyroidectomy with removal of a benign lesion in her thyroid. She does have a long-standing history of hypothyroidism and is currently on hormone replacement therapy. We will see her for routine follow up in 3 months or sooner if indicated. Orders Placed This Encounter    COMPLETE CBC & AUTO DIFF WBC    InHouse CBC ([employer])     Standing Status:   Future     Number of Occurrences:   1     Standing Expiration Date:   4/83/6028    METABOLIC PANEL, COMPREHENSIVE     Standing Status:   Future     Standing Expiration Date:   5/4/2019    IRON PROFILE     Standing Status:   Future     Standing Expiration Date:   5/4/2019    FERRITIN     Standing Status:   Future     Standing Expiration Date:   5/4/2019       Colby Ring MD  5/3/2018

## 2018-05-03 NOTE — MR AVS SNAPSHOT
[address] [place] [address] 
 
 
 [address] [facility] [address] [address] 
[phone] Patient: Lauren Srivastava MRN: [id number] ELD:04[birth year] Visit Information Date & Time Provider Department Dept. Phone Encounter #  
 5/3/2018 10:15 AM Edwin Jones MD [facility] [id number] Follow-up Instructions Return in about 3 months (around 8/3/2018). Your Appointments 8/2/2018 10:00 AM  
[facility] Appt Note: 3 MO  [facility] [address] [address]  
[phone]  
  
   
 [address] [facility] [address] [address] Upcoming Health Maintenance Date Due DTaP/Tdap/Td series (1 - Tdap) 10/4/1965 FOBT Q 1 YEAR AGE 50-75 10/4/1994 ZOSTER VACCINE AGE 60> 8/4/2004 GLAUCOMA SCREENING Q2Y 10/4/2009 MEDICARE YEARLY EXAM 3/14/2018 Influenza Age 5 to Adult 8/1/2018 BREAST CANCER SCRN MAMMOGRAM 10/17/2019 Allergies as of 5/3/2018  Review Complete On: 5/3/2018 By: Edwin Jones MD  
  
 Severity Noted Reaction Type Reactions Codeine  06/16/2016    Other (comments) Iodinated Contrast- Oral And Iv Dye  06/16/2016    Other (comments) Tramadol  06/16/2016    Other (comments) Current Immunizations  Reviewed on 4/11/2018 Name Date Influenza Vaccine 10/23/2017 12:00 AM, 10/21/2016 12:00 AM, 10/29/2015 12:00 AM, 10/31/2014 12:00 AM  
 Influenza Vaccine (Quad) 9/27/2013 12:00 AM  
 Influenza Vaccine (Trivalent) 10/5/2012 12:00 AM  
 Pneumococcal Conjugate (PCV-13) 10/29/2015 12:00 AM  
 Pneumococcal Polysaccharide (PPSV-23) 10/31/2014 12:00 AM, 10/1/2009 12:00 AM, 1/1/2009 12:00 AM  
  
 Not reviewed this visit You Were Diagnosed With   
  
 Codes Comments MDS (myelodysplastic syndrome), low grade (HCC)    -  Primary ICD-10-CM: D26.75 ICD-9-CM: 238.72   
 Chronic anemia     ICD-10-CM: D64.9 ICD-9-CM: 286. 9 Thyroid mass     ICD-10-CM: E07.9 ICD-9-CM: 246. 9 Vitals BP Pulse Temp Weight(growth percentile) BMI OB Status 160/82 69 97.9 °F (36.6 °C) (Oral) 134 lb 9.6 oz (61.1 kg) 23.1 kg/m2 Postmenopausal  
 Smoking Status Never Smoker BMI and BSA Data Body Mass Index Body Surface Area  
 23.1 kg/m 2 1.66 m 2 Preferred Pharmacy Pharmacy Name Phone [address], [address] [facility] [phone] Your Updated Medication List  
  
   
This list is accurate as of 5/3/18 10:51 AM.  Always use your most recent med list.  
  
  
  
  
 ascorbic acid (vitamin C) 500 mg tablet Commonly known as:  VITAMIN C Take  by mouth daily. aspirin 81 mg tablet Take 81 mg by mouth. carvedilol 25 mg tablet Commonly known as:  Roula Alstrom Take 25 mg by mouth two (2) times daily (with meals). CHILDREN'S CLARITIN 5 mg Chew Generic drug:  Loratadine Take 1 Tab by mouth daily. glimepiride 1 mg tablet Commonly known as:  AMARYL Take 1 mg by mouth Daily (before breakfast). levocetirizine 5 mg tablet Commonly known as:  Nelia Borders Take  by mouth daily. levothyroxine 50 mcg tablet Commonly known as:  SYNTHROID Take  by mouth Daily (before breakfast). metFORMIN 500 mg tablet Commonly known as:  GLUCOPHAGE  
250 mg daily. multivitamin tablet Commonly known as:  ONE A DAY Take 1 Tab by mouth daily. nitroglycerin 0.4 mg SL tablet Commonly known as:  NITROSTAT  
by SubLINGual route as needed for Chest Pain.  
  
 pantoprazole 40 mg tablet Commonly known as:  PROTONIX Take 40 mg by mouth daily. PROBIOTIC PO Take  by mouth daily. quinapril 40 mg tablet Commonly known as:  ACCUPRIL Take 40 mg by mouth daily. simvastatin 40 mg tablet Commonly known as:  ZOCOR Take  by mouth nightly. VITAMIN D2 50,000 unit capsule Generic drug:  ergocalciferol Take 50,000 Units by mouth every fourteen (14) days. We Performed the Following COMPLETE CBC & AUTO DIFF WBC [10807 CPT(R)] FERRITIN [88474 CPT(R)] IRON PROFILE [id number] CPT(R)] METABOLIC PANEL, COMPREHENSIVE [76943 CPT(R)] Follow-up Instructions Return in about 3 months (around 8/3/2018). To-Do List   
 05/03/2018 Lab:  CBC WITH 3 PART DIFF   
  
 05/03/2018 11:00 AM  
  Appointment with [address] at [address] [phone]  
  
 05/23/2018 11:00 AM  
  Appointment with [address] at [address] [phone]) Patient Instructions Myelodysplastic Syndromes: Care Instructions Your Care Instructions Myelodysplastic syndromes, also called MDS, are a group of rare conditions in which the bone marrow does not make enough healthy blood cells. Normally, the bone marrow makes red blood cells, white blood cells, and platelets. These cells carry oxygen in the blood, help the body fight infections, and help the blood clot. With MDS, you may feel weak and tired, get infections often, and bruise easily, although symptoms tend to vary. MDS is a form of blood cancer. In some cases, MDS can turn into acute myeloid leukemia (AML), another type of cancer. Some people develop MDS after treatment for cancer or exposure to pesticides or other chemicals. But in most cases, the cause of MDS is not known. Your doctor will use the results of blood tests to guide your treatment. There are many types of MDS, with different treatment plans for each. If you have enough red blood cells and are feeling all right, you may not need active treatment, but you and your doctor will want to watch your condition carefully. If you start feeling lightheaded and have no energy, you may need a blood transfusion.  Your doctor also may give you antibiotics to prevent or treat infection. Follow-up care is a key part of your treatment and safety. Be sure to make and go to all appointments, and call your doctor if you are having problems. It's also a good idea to know your test results and keep a list of the medicines you take. How can you care for yourself at home? · Take your medicines exactly as prescribed. Call your doctor if you think you are having a problem with your medicine. You will get more details on the specific medicines your doctor prescribes. · If your doctor prescribed antibiotics, take them as directed. Do not stop taking them just because you feel better. You need to take the full course of antibiotics. If you have side effects from antibiotics, tell your doctor. · Take steps to control your stress and workload. Learn relaxation techniques. ¨ Share your feelings. Stress and tension affect our emotions. By expressing your feelings to others, you may be able to understand and cope with them. ¨ Consider joining a support group. Talking about a problem with your spouse, a good friend, or other people with similar problems is a good way to reduce tension and stress. ¨ Express yourself with art. Try writing, crafts, dance, or art to relieve stress. ¨ Be kind to your body and mind. Getting enough sleep, eating a healthy diet, and taking time to do things you enjoy can contribute to an overall feeling of balance in your life and help reduce stress. ¨ Get help if you need it. Discuss your concerns with your doctor or counselor. · Do not smoke. Smoking can make blood problems worse. If you need help quitting, talk to your doctor about stop-smoking programs and medicines. These can increase your chances of quitting for good. · If you have not already done so, prepare a list of advance directives.  Advance directives are instructions to your doctor and family members about what kind of care you want if you become unable to speak or express yourself. · Call the [employer] (1-179.818.6143) or visit its website at [employer]inBlue[employer]. Gainspeed for more information. When should you call for help? Call 911 anytime you think you may need emergency care. For example, call if: 
? · You passed out (lost consciousness). ?Call your doctor now or seek immediate medical care if: 
? · You have a fever. ? · You have abnormal bleeding. ? · You have new or worse pain. ? · You think you have an infection. ? · You have new symptoms, such as a cough, belly pain, vomiting, diarrhea, or a rash. ? Watch closely for changes in your health, and be sure to contact your doctor if: 
? · You are much more tired than usual.  
? · You have swollen glands in your armpits, groin, or neck. ? · You do not get better as expected. Where can you learn more? Go to http://bindu-apollo.info/. Enter Nayla Ferro in the search box to learn more about \"Myelodysplastic Syndromes: Care Instructions. \" Current as of: May 12, 2017 Content Version: 11.4 © 4887-4541 [employer]. Care instructions adapted under license by [employer] (which disclaims liability or warranty for this information). If you have questions about a medical condition or this instruction, always ask your healthcare professional. [address] any warranty or liability for your use of this information. Introducing [facility]! Dear Carlos Vasquez: 
Thank you for requesting a Buru Buru account. Our records indicate that you already have an active [employer]u Buru account. You can access your account anytime at [email or web address] Did you know that you can access your hospital and ER discharge instructions at any time in Buru Buru? You can also review all of your test results from your hospital stay or ER visit. Additional Information If you have questions, please visit the Frequently Asked Questions section of the [employer] website at [email or web address] Remember, [employer]ING[employer]x is NOT to be used for urgent needs. For medical emergencies, dial 911. Now available from your iPhone and Android! Please provide this summary of care documentation to your next provider. Your primary care clinician is listed as Eusebia Doss. If you have any questions after today's visit, please call [phone].

## 2018-05-10 LAB — REQUEST PROBLEM, 100552: NORMAL

## 2018-05-11 LAB
ALBUMIN SERPL-MCNC: 4.4 G/DL (ref 3.5–4.8)
ALBUMIN/GLOB SERPL: 1.5 {RATIO} (ref 1.2–2.2)
ALP SERPL-CCNC: 64 IU/L (ref 39–117)
ALT SERPL-CCNC: 14 IU/L (ref 0–32)
AST SERPL-CCNC: 19 IU/L (ref 0–40)
BILIRUB SERPL-MCNC: 0.6 MG/DL (ref 0–1.2)
BUN SERPL-MCNC: 19 MG/DL (ref 8–27)
BUN/CREAT SERPL: 16 (ref 12–28)
CALCIUM SERPL-MCNC: 9.5 MG/DL (ref 8.7–10.3)
CHLORIDE SERPL-SCNC: 103 MMOL/L (ref 96–106)
CO2 SERPL-SCNC: 25 MMOL/L (ref 18–29)
CREAT SERPL-MCNC: 1.16 MG/DL (ref 0.57–1)
FERRITIN SERPL-MCNC: 91 NG/ML (ref 15–150)
GFR SERPLBLD CREATININE-BSD FMLA CKD-EPI: 47 ML/MIN/1.73
GFR SERPLBLD CREATININE-BSD FMLA CKD-EPI: 54 ML/MIN/1.73
GLOBULIN SER CALC-MCNC: 2.9 G/DL (ref 1.5–4.5)
GLUCOSE SERPL-MCNC: 170 MG/DL (ref 65–99)
IRON SATN MFR SERPL: 21 % (ref 15–55)
IRON SERPL-MCNC: 57 UG/DL (ref 27–139)
POTASSIUM SERPL-SCNC: 4.4 MMOL/L (ref 3.5–5.2)
PROT SERPL-MCNC: 7.3 G/DL (ref 6–8.5)
SODIUM SERPL-SCNC: 142 MMOL/L (ref 134–144)
TIBC SERPL-MCNC: 274 UG/DL (ref 250–450)
UIBC SERPL-MCNC: 217 UG/DL (ref 118–369)

## 2018-05-23 ENCOUNTER — HOSPITAL ENCOUNTER (OUTPATIENT)
Dept: INFUSION THERAPY | Age: 73
End: 2018-05-23

## 2018-06-30 PROBLEM — I42.8 NON-ISCHEMIC CARDIOMYOPATHY (HCC): Chronic | Status: ACTIVE | Noted: 2018-06-30

## 2018-06-30 PROBLEM — K21.00 GASTROESOPHAGEAL REFLUX DISEASE WITH ESOPHAGITIS: Status: ACTIVE | Noted: 2018-06-30

## 2018-06-30 PROBLEM — R07.89 OTHER CHEST PAIN: Status: ACTIVE | Noted: 2018-06-30

## 2018-06-30 PROBLEM — I25.10 CORONARY ARTERY DISEASE: Status: ACTIVE | Noted: 2018-06-30

## 2018-06-30 PROBLEM — E78.5 HYPERLIPIDEMIA LDL GOAL <70: Chronic | Status: ACTIVE | Noted: 2018-06-30

## 2018-08-02 ENCOUNTER — OFFICE VISIT (OUTPATIENT)
Dept: ONCOLOGY | Age: 73
End: 2018-08-02

## 2018-08-02 ENCOUNTER — HOSPITAL ENCOUNTER (OUTPATIENT)
Dept: ONCOLOGY | Age: 73
Discharge: HOME | End: 2018-08-02

## 2018-08-02 VITALS
WEIGHT: 136.4 LBS | HEART RATE: 71 BPM | BODY MASS INDEX: 24.16 KG/M2 | SYSTOLIC BLOOD PRESSURE: 144 MMHG | TEMPERATURE: 97.8 F | DIASTOLIC BLOOD PRESSURE: 72 MMHG | RESPIRATION RATE: 16 BRPM

## 2018-08-02 DIAGNOSIS — D46.20 MDS (MYELODYSPLASTIC SYNDROME), LOW GRADE (HCC): Primary | ICD-10-CM

## 2018-08-02 DIAGNOSIS — K21.00 GASTROESOPHAGEAL REFLUX DISEASE WITH ESOPHAGITIS: ICD-10-CM

## 2018-08-02 DIAGNOSIS — D64.9 CHRONIC ANEMIA: ICD-10-CM

## 2018-08-02 DIAGNOSIS — D46.20 MDS (MYELODYSPLASTIC SYNDROME), LOW GRADE (HCC): ICD-10-CM

## 2018-08-02 DIAGNOSIS — E07.9 THYROID MASS: ICD-10-CM

## 2018-08-02 LAB
BASO+EOS+MONOS # BLD AUTO: 0.3 K/UL (ref 0–2.3)
BASO+EOS+MONOS # BLD AUTO: 6 % (ref 0.1–17)
DIFFERENTIAL METHOD BLD: ABNORMAL
ERYTHROCYTE [DISTWIDTH] IN BLOOD BY AUTOMATED COUNT: 12.8 % (ref 11.5–14.5)
HCT VFR BLD AUTO: 33 % (ref 36–48)
HGB BLD-MCNC: 10.9 G/DL (ref 12–16)
LYMPHOCYTES # BLD: 1.5 K/UL (ref 1.1–5.9)
LYMPHOCYTES NFR BLD: 30 % (ref 14–44)
MCH RBC QN AUTO: 29.6 PG (ref 25–35)
MCHC RBC AUTO-ENTMCNC: 33 G/DL (ref 31–37)
MCV RBC AUTO: 89.7 FL (ref 78–102)
NEUTS SEG # BLD: 3.3 K/UL (ref 1.8–9.5)
NEUTS SEG NFR BLD: 64 % (ref 40–70)
PLATELET # BLD AUTO: 174 K/UL (ref 140–440)
RBC # BLD AUTO: 3.68 M/UL (ref 4.1–5.1)
WBC # BLD AUTO: 5.1 K/UL (ref 4.5–13)

## 2018-08-02 NOTE — PROGRESS NOTES
Hematology/Oncology  Progress Note Name: Zuly Haywood Date: 2018 : 1945 PCP: Dr. Riddhi Howell Ms. Eva Hammer is a 67 y.o.  female with refractory anemia/early MDS. She had a bone marrow biopsy on 13 that revealed no evidence of hematopoetic neoplasm or dysplasia. She has present storage iron and normal female karyotype. Current Therapy: CBC Q3 months, EDUARDO therapy will be warranted if h/h drops below 10 and 30 respectively. Subjective:  
 
Ms. Eva Hammer is a 76-year-old woman with myelodysplastic syndrome and refractory anemia. The patient presents for routine 3 month follow up. The pt denies SOB, CP, unexplained fevers or unexplained weight changes. Her appetite and energy level is reasonably good. She states she has change her diet and eating more healthy foods. She continues to take her iron once daily. She reports that she has no new questions or concerns to report. Past Medical History:  
Diagnosis Date  Allergic rhinitis  Anemia  Anemia 2018  Anemia NEC  Arthritis  Blurred vision  Cardiac defibrillator in place  Diabetes mellitus ([address]  Dyspepsia and other specified disorders of function of stomach  GERD (gastroesophageal reflux disease)  Heart attack ([address]  Heart disease  Hemorrhoid  Hyperlipemia  Hypertension  Ill-defined condition Myeloblastic Syndrome  Joint pain  LBBB (left bundle branch block)  MDS (myelodysplastic syndrome) ([address]  Myelodysplastic syndrome, low grade ([address] 2018  Pacemaker  Pacemaker  Sinus problem  SOB (shortness of breath)  Thyroid disorder  Vitamin D deficiency Past Surgical History:  
Procedure Laterality Date  HX CATARACT REMOVAL Bilateral   
 HX CHOLECYSTECTOMY  HX HYSTERECTOMY  HX PACEMAKER [employer]  HX PACEMAKER PLACEMENT    
 HX THYROIDECTOMY Social History Social History  Marital status:  Spouse name: N/A  
 Number of children: N/A  
 Years of education: N/A Occupational History  Not on file. Social History Main Topics  Smoking status: Never Smoker  Smokeless tobacco: Never Used  Alcohol use No  
 Drug use: No  
 Sexual activity: Not on file Other Topics Concern  Not on file Social History Narrative Family History Problem Relation Age of Onset  Diabetes Mother  Diabetes Father  Cancer Father  Heart Disease Father  Hypertension Father  Diabetes Sister  Hypertension Sister  Stroke Sister  Diabetes Sister  Hypertension Sister Current Outpatient Prescriptions Medication Sig Dispense Refill  nitroglycerin (NITROSTAT) 0.4 mg SL tablet 1 Tab by SubLINGual route DIALYSIS PRN for Chest Pain. 20 Tab 0  
 glimepiride (AMARYL) 1 mg tablet Take 1 mg by mouth Daily (before breakfast).  Loratadine (CHILDREN'S CLARITIN) 5 mg chew Take 1 Tab by mouth daily.  levocetirizine (XYZAL) 5 mg tablet Take  by mouth daily.  LACTOBACILLUS ACIDOPHILUS (PROBIOTIC PO) Take  by mouth daily.  ascorbic acid, vitamin C, (VITAMIN C) 500 mg tablet Take  by mouth daily.  metFORMIN (GLUCOPHAGE) 500 mg tablet 250 mg daily as needed.  levothyroxine (SYNTHROID) 50 mcg tablet Take  by mouth Daily (before breakfast).  ergocalciferol (VITAMIN D2) 50,000 unit capsule Take 50,000 Units by mouth every fourteen (14) days.  carvedilol (COREG) 25 mg tablet Take 25 mg by mouth two (2) times daily (with meals).  pantoprazole (PROTONIX) 40 mg tablet Take 40 mg by mouth daily.  aspirin 81 mg tablet Take 81 mg by mouth.  quinapril (ACCUPRIL) 40 mg tablet Take 40 mg by mouth daily.  simvastatin (ZOCOR) 40 mg tablet Take  by mouth nightly.  multivitamin (ONE A DAY) tablet Take 1 Tab by mouth daily. Review of Systems Constitutional: The patient has no acute distress or discomfort. HEENT: The patient denies recent head trauma, eye pain, blurred vision,  hearing deficit, oropharyngeal mucosal pain or lesions, and the patient denies throat pain or discomfort. Lymphatics: The patient denies palpable peripheral lymphadenopathy. Hematologic: The patient denies having bruising, bleeding, or progressive fatigue. Respiratory: Patient denies having shortness of breath, cough, sputum production, fever, or dyspnea on exertion. Cardiovascular: The patient denies having leg pain, leg swelling, heart palpitations, chest permit, chest pain, or lightheadedness. The patient denies having dyspnea on exertion. Gastrointestinal: The patient denies having nausea, emesis, or diarrhea. The patient denies having any hematemesis or blood in the stool. Genitourinary: Patient denies having urinary urgency, frequency, or dysuria. The patient denies having blood in the urine. Psychological: The patient denies having symptoms of nervousness, anxiety, depression, or thoughts of harming himself some of this. Skin: Patient denies having skin rashes, skin, ulcerations, or unexplained itching or pruritus. Musculoskeletal: The patient denies having pain in the joints or bones. Objective:  
 
Visit Vitals  /72 (BP 1 Location: Left arm, BP Patient Position: Sitting)  Pulse 71  Temp 97.8 °F (36.6 °C) (Oral)  Resp 16  Wt 61.9 kg (136 lb 6.4 oz)  BMI 24.16 kg/m2 ECOG 0 Pain score 0/10 Physical Exam:  
Gen. Appearance: The patient is in no acute distress. Skin: no bruise or rashes  HEENT: The exam is unremarkable. Neck: Supple without lymphadenopathy or thyromegaly. Lungs: Clear to auscultation and percussion; there are no wheezes or rhonchi. Heart: Regular rate and rhythm; there are no murmurs, gallops, or rubs. Abdomen: Bowel sounds are present and normal.  There is no guarding, tenderness, or hepatosplenomegaly. Extremities: There is no clubbing, cyanosis, or edema. Neurologic: There are no focal neurologic deficits. Lymphatics: There is no palpable peripheral lymphadenopathy. Lab data: 
   
Results for orders placed or performed during the hospital encounter of 08/02/18 CBC WITH 3 PART DIFF     Status: Abnormal  
Result Value Ref Range Status WBC 5.1 4.5 - 13.0 K/uL Final  
 RBC 3.68 (L) 4.10 - 5.10 M/uL Final  
 HGB 10.9 (L) 12.0 - 16.0 g/dL Final  
 HCT 33.0 (L) 36 - 48 % Final  
 MCV 89.7 78 - 102 FL Final  
 MCH 29.6 25.0 - 35.0 PG Final  
 MCHC 33.0 31 - 37 g/dL Final  
 RDW 12.8 11.5 - 14.5 % Final  
 PLATELET 683 931 - 627 K/uL Final  
 NEUTROPHILS 64 40 - 70 % Final  
 MIXED CELLS 6 0.1 - 17 % Final  
 LYMPHOCYTES 30 14 - 44 % Final  
 ABS. NEUTROPHILS 3.3 1.8 - 9.5 K/UL Final  
 ABS. MIXED CELLS 0.3 0.0 - 2.3 K/uL Final  
 ABS. LYMPHOCYTES 1.5 1.1 - 5.9 K/UL Final  
  Comment: Test performed at [address]. Results Reviewed by Medical Director. DF AUTOMATED   Final  
 
 
   
Assessment:  
 
1. MDS (myelodysplastic syndrome), low grade ([facility] 2. Chronic anemia 3. Thyroid mass 4. Gastroesophageal reflux disease with esophagitis Plan: MDS/refractory anemia: WBC is normal today at 5.1, hemoglobin is 10.9 g/dL with hematocrit of 33%. She is not a candidate for Procrit at this time. Chronic anemia: The current hemoglobin hematocrit 10.9 g/dL and 33% respectively. I will check her iron profile levels at this time if the ferritin level declines below 25 ng/mL she will be offered replenishment therapy with intravenous iron. Thyroid mass: Since her last clinic visit, the patient has undergone partial thyroidectomy with removal of a benign lesion in her thyroid. She does have a long-standing history of hypothyroidism and is currently on hormone replacement therapy. We will see her for routine follow up in 3 months or sooner if indicated. Orders Placed This Encounter  COMPLETE CBC & AUTO DIFF WBC  InHouse CBC ([employer]) Standing Status:   Future Number of Occurrences:   1 Standing Expiration Date:   8/9/2018  METABOLIC PANEL, COMPREHENSIVE Standing Status:   Future Number of Occurrences:   1 Standing Expiration Date:   8/3/2019  
 IRON PROFILE Standing Status:   Future Number of Occurrences:   1 Standing Expiration Date:   8/3/2019  FERRITIN Standing Status:   Future Number of Occurrences:   1 Standing Expiration Date:   8/3/2019 Jhonathan Goel MD 
8/2/2018

## 2018-08-02 NOTE — MR AVS SNAPSHOT
[facility] 
 
 
 [address] [facility] [address] [address] 
[phone] Patient: Alex Cabrera MRN: [id number]:[id number] Visit Information Date & Time Provider Department Dept. Phone Encounter #  
 8/2/2018 10:00 AM MD Yuliet Chakraborty [id number] Follow-up Instructions Return in about 3 months (around 11/2/2018). Your Appointments 11/1/2018 10:00 AM  
Office Visit with MD Yuliet Chakraborty 115 ([facility]) Appt Note: 3 MO  [place] [address] [address] [address]  
[phone]  
  
   
 [address] [facility] [address] [address] Upcoming Health Maintenance Date Due DTaP/Tdap/Td series (1 - Tdap) 10/4/1965 FOBT Q 1 YEAR AGE 50-75 10/4/1994 ZOSTER VACCINE AGE 60> 8/4/2004 GLAUCOMA SCREENING Q2Y 10/4/2009 MEDICARE YEARLY EXAM 3/14/2018 Influenza Age 5 to Adult 8/1/2018 BREAST CANCER SCRN MAMMOGRAM 10/17/2019 Allergies as of 8/2/2018  Review Complete On: 8/2/2018 By: Min Spence MD  
  
 Severity Noted Reaction Type Reactions Codeine  06/16/2016    Other (comments) Iodinated Contrast- Oral And Iv Dye  06/16/2016    Other (comments) Tramadol  06/16/2016    Other (comments) Current Immunizations  Reviewed on 4/11/2018 Name Date Influenza Vaccine 10/23/2017 12:00 AM, 10/21/2016 12:00 AM, 10/29/2015 12:00 AM, 10/31/2014 12:00 AM  
 Influenza Vaccine (Quad) 9/27/2013 12:00 AM  
 Influenza Vaccine (Trivalent) 10/5/2012 12:00 AM  
 Pneumococcal Conjugate (PCV-13) 10/29/2015 12:00 AM  
 Pneumococcal Polysaccharide (PPSV-23) 10/31/2014 12:00 AM, 10/1/2009 12:00 AM, 1/1/2009 12:00 AM  
  
 Not reviewed this visit You Were Diagnosed With   
  
 Codes Comments MDS (myelodysplastic syndrome), low grade (HCC)    -  Primary ICD-10-CM: H21.27 ICD-9-CM: 238.72 Chronic anemia     ICD-10-CM: D64.9 ICD-9-CM: 335. 9 Thyroid mass     ICD-10-CM: E07.9 ICD-9-CM: 246.9 Gastroesophageal reflux disease with esophagitis     ICD-10-CM: K21.0 ICD-9-CM: 530.11 Vitals BP Pulse Temp Resp Weight(growth percentile) BMI  
 144/72 (BP 1 Location: Left arm, BP Patient Position: Sitting) 71 97.8 °F (36.6 °C) (Oral) 16 136 lb 6.4 oz (61.9 kg) 24.16 kg/m2 OB Status Smoking Status Postmenopausal Never Smoker BMI and BSA Data Body Mass Index Body Surface Area  
 24.16 kg/m 2 1.66 m 2 Preferred Pharmacy Pharmacy Name Phone [id number], [address] [facility] [phone] Your Updated Medication List  
  
   
This list is accurate as of 8/2/18 10:18 AM.  Always use your most recent med list.  
  
  
  
  
 ascorbic acid (vitamin C) 500 mg tablet Commonly known as:  VITAMIN C Take  by mouth daily. aspirin 81 mg tablet Take 81 mg by mouth. carvedilol 25 mg tablet Commonly known as:  Muna Locket Take 25 mg by mouth two (2) times daily (with meals). CHILDREN'S CLARITIN 5 mg Chew Generic drug:  Loratadine Take 1 Tab by mouth daily. glimepiride 1 mg tablet Commonly known as:  AMARYL Take 1 mg by mouth Daily (before breakfast). levocetirizine 5 mg tablet Commonly known as:  Birdena Basilia Take  by mouth daily. levothyroxine 50 mcg tablet Commonly known as:  SYNTHROID Take  by mouth Daily (before breakfast). metFORMIN 500 mg tablet Commonly known as:  GLUCOPHAGE  
250 mg daily as needed. multivitamin tablet Commonly known as:  ONE A DAY Take 1 Tab by mouth daily. nitroglycerin 0.4 mg SL tablet Commonly known as:  NITROSTAT  
1 Tab by SubLINGual route DIALYSIS PRN for Chest Pain.  
  
 pantoprazole 40 mg tablet Commonly known as:  PROTONIX Take 40 mg by mouth daily. PROBIOTIC PO Take  by mouth daily. quinapril 40 mg tablet Commonly known as:  ACCUPRIL Take 40 mg by mouth daily. simvastatin 40 mg tablet Commonly known as:  ZOCOR Take  by mouth nightly. VITAMIN D2 50,000 unit capsule Generic drug:  ergocalciferol Take 50,000 Units by mouth every fourteen (14) days. We Performed the Following COMPLETE CBC & AUTO DIFF WBC [27909 CPT(R)] FERRITIN [21665 CPT(R)] IRON PROFILE [id number] CPT(R)] METABOLIC PANEL, COMPREHENSIVE [03865 CPT(R)] Follow-up Instructions Return in about 3 months (around 11/2/2018). To-Do List   
 08/02/2018 Lab:  CBC WITH 3 PART DIFF   
  
 08/02/2018 11:00 AM  
  Appointment with Jayla Spann 4 at [facility] [phone]) Introducing [facility] SERVICES! Dear Bill Jacobson: 
Thank you for requesting a [employer] account. Our records indicate that you already have an active [employer] account. You can access your account anytime at [email or web address] Did you know that you can access your hospital and ER discharge instructions at any time in [employer]? You can also review all of your test results from your hospital stay or ER visit. Additional Information If you have questions, please visit the Frequently Asked Questions section of the [employer] website at [email or web address] Remember, [employer] is NOT to be used for urgent needs. For medical emergencies, dial 911. Now available from your iPhone and Android! Please provide this summary of care documentation to your next provider. Your primary care clinician is listed as Raymundo Santos. If you have any questions after today's visit, please call [phone].

## 2018-08-03 LAB
ALBUMIN SERPL-MCNC: 4.7 G/DL (ref 3.5–4.8)
ALBUMIN/GLOB SERPL: 1.6 {RATIO} (ref 1.2–2.2)
ALP SERPL-CCNC: 91 IU/L (ref 39–117)
ALT SERPL-CCNC: 17 IU/L (ref 0–32)
AST SERPL-CCNC: 22 IU/L (ref 0–40)
BILIRUB SERPL-MCNC: 0.4 MG/DL (ref 0–1.2)
BUN SERPL-MCNC: 17 MG/DL (ref 8–27)
BUN/CREAT SERPL: 14 (ref 12–28)
CALCIUM SERPL-MCNC: 9.5 MG/DL (ref 8.7–10.3)
CHLORIDE SERPL-SCNC: 103 MMOL/L (ref 96–106)
CO2 SERPL-SCNC: 25 MMOL/L (ref 20–29)
CREAT SERPL-MCNC: 1.23 MG/DL (ref 0.57–1)
FERRITIN SERPL-MCNC: 115 NG/ML (ref 15–150)
GLOBULIN SER CALC-MCNC: 3 G/DL (ref 1.5–4.5)
GLUCOSE SERPL-MCNC: 133 MG/DL (ref 65–99)
IRON SATN MFR SERPL: 24 % (ref 15–55)
IRON SERPL-MCNC: 71 UG/DL (ref 27–139)
POTASSIUM SERPL-SCNC: 4.6 MMOL/L (ref 3.5–5.2)
PROT SERPL-MCNC: 7.7 G/DL (ref 6–8.5)
SODIUM SERPL-SCNC: 143 MMOL/L (ref 134–144)
TIBC SERPL-MCNC: 292 UG/DL (ref 250–450)
UIBC SERPL-MCNC: 221 UG/DL (ref 118–369)

## 2018-11-07 ENCOUNTER — HOSPITAL ENCOUNTER (OUTPATIENT)
Dept: ONCOLOGY | Age: 73
Discharge: HOME | End: 2018-11-07

## 2018-11-07 ENCOUNTER — OFFICE VISIT (OUTPATIENT)
Dept: ONCOLOGY | Age: 73
End: 2018-11-07

## 2018-11-07 VITALS
HEART RATE: 79 BPM | DIASTOLIC BLOOD PRESSURE: 72 MMHG | HEIGHT: 64 IN | TEMPERATURE: 98 F | RESPIRATION RATE: 16 BRPM | SYSTOLIC BLOOD PRESSURE: 144 MMHG | OXYGEN SATURATION: 98 % | BODY MASS INDEX: 24.01 KG/M2 | WEIGHT: 140.6 LBS

## 2018-11-07 DIAGNOSIS — E07.9 THYROID MASS: ICD-10-CM

## 2018-11-07 DIAGNOSIS — D46.20 MDS (MYELODYSPLASTIC SYNDROME), LOW GRADE (HCC): ICD-10-CM

## 2018-11-07 DIAGNOSIS — D46.20 MDS (MYELODYSPLASTIC SYNDROME), LOW GRADE (HCC): Primary | ICD-10-CM

## 2018-11-07 DIAGNOSIS — D64.9 CHRONIC ANEMIA: ICD-10-CM

## 2018-11-07 LAB
BASO+EOS+MONOS # BLD AUTO: 0.8 K/UL (ref 0–2.3)
BASO+EOS+MONOS # BLD AUTO: 8 % (ref 0.1–17)
DIFFERENTIAL METHOD BLD: ABNORMAL
ERYTHROCYTE [DISTWIDTH] IN BLOOD BY AUTOMATED COUNT: 11.9 % (ref 11.5–14.5)
HCT VFR BLD AUTO: 30.4 % (ref 36–48)
HGB BLD-MCNC: 10.4 G/DL (ref 12–16)
LYMPHOCYTES # BLD: 2 K/UL (ref 1.1–5.9)
LYMPHOCYTES NFR BLD: 18 % (ref 14–44)
MCH RBC QN AUTO: 30.8 PG (ref 25–35)
MCHC RBC AUTO-ENTMCNC: 34.2 G/DL (ref 31–37)
MCV RBC AUTO: 89.9 FL (ref 78–102)
NEUTS SEG # BLD: 8.5 K/UL (ref 1.8–9.5)
NEUTS SEG NFR BLD: 75 % (ref 40–70)
PLATELET # BLD AUTO: 175 K/UL (ref 140–440)
RBC # BLD AUTO: 3.38 M/UL (ref 4.1–5.1)
WBC # BLD AUTO: 11.3 K/UL (ref 4.5–13)

## 2018-11-07 NOTE — PROGRESS NOTES
Hematology/Oncology  Progress Note    Name: Catarina Miles  Date: 2018  : 1945    PCP: Dr. Alejandro Carrion    Ms. Lorena Landrum is a 75 y.o.  female with refractory anemia/early MDS. She had a bone marrow biopsy on 13 that revealed no evidence of hematopoetic neoplasm or dysplasia. She has present storage iron and normal female karyotype. Current Therapy: CBC Q3 months, EDUARDO therapy will be warranted if h/h drops below 10 and 30 respectively. Subjective:     Ms. Lorena Landrum is a 79-year-old woman with myelodysplastic syndrome and refractory anemia. The patient presents for routine 3 month follow up. The pt denies SOB, CP, unexplained fevers or unexplained weight changes. Her appetite and energy level is reasonably good. She states she has change her diet and eating more healthy foods. She continues to take her iron once daily. She reports that she has no new complaints or concerns to report.   Past Medical History:   Diagnosis Date    Allergic rhinitis     Anemia     Anemia 2018    Anemia NEC     Arthritis     Blurred vision     Cardiac defibrillator in place     Diabetes mellitus ([address]     Dyspepsia and other specified disorders of function of stomach     GERD (gastroesophageal reflux disease)     Heart attack ([address]     Heart disease     Hemorrhoid     Hyperlipemia     Hypertension     Ill-defined condition     Myeloblastic Syndrome    Joint pain     LBBB (left bundle branch block)     MDS (myelodysplastic syndrome) (HCC)     Myelodysplastic syndrome, low grade ([address] 2018    Pacemaker     Pacemaker     Sinus problem     SOB (shortness of breath)     Thyroid disorder     Vitamin D deficiency      Past Surgical History:   Procedure Laterality Date    HX CATARACT REMOVAL Bilateral     HX CHOLECYSTECTOMY      HX HYSTERECTOMY      HX PACEMAKER      [employer]    HX PACEMAKER PLACEMENT      HX THYROIDECTOMY       Social History     Socioeconomic History    Marital status:      Spouse name: Not on file    Number of children: Not on file    Years of education: Not on file    Highest education level: Not on file   Social Needs    Financial resource strain: Not on file    Food insecurity - worry: Not on file    Food insecurity - inability: Not on file    Transportation needs - medical: Not on file   Ayasdi needs - non-medical: Not on file   Occupational History    Not on file   Tobacco Use    Smoking status: Never Smoker    Smokeless tobacco: Never Used   Substance and Sexual Activity    Alcohol use: No    Drug use: No    Sexual activity: Not on file   Other Topics Concern    Not on file   Social History Narrative    Not on file     Family History   Problem Relation Age of Onset    Diabetes Mother     Diabetes Father     Cancer Father     Heart Disease Father     Hypertension Father     Diabetes Sister     Hypertension Sister     Stroke Sister     Diabetes Sister     Hypertension Sister      Current Outpatient Medications   Medication Sig Dispense Refill    methocarbamol (ROBAXIN) 750 mg tablet Take 1 Tab by mouth three (3) times daily. 15 Tab 0    nitroglycerin (NITROSTAT) 0.4 mg SL tablet 1 Tab by SubLINGual route DIALYSIS PRN for Chest Pain. 20 Tab 0    glimepiride (AMARYL) 1 mg tablet Take 1 mg by mouth Daily (before breakfast).  Loratadine (CHILDREN'S CLARITIN) 5 mg chew Take 1 Tab by mouth daily.  levocetirizine (XYZAL) 5 mg tablet Take  by mouth daily.  LACTOBACILLUS ACIDOPHILUS (PROBIOTIC PO) Take  by mouth daily.  ascorbic acid, vitamin C, (VITAMIN C) 500 mg tablet Take  by mouth daily.  metFORMIN (GLUCOPHAGE) 500 mg tablet 250 mg daily as needed.  levothyroxine (SYNTHROID) 50 mcg tablet Take  by mouth Daily (before breakfast).  ergocalciferol (VITAMIN D2) 50,000 unit capsule Take 50,000 Units by mouth every fourteen (14) days.       carvedilol (COREG) 25 mg tablet Take 25 mg by mouth two (2) times daily (with meals).  pantoprazole (PROTONIX) 40 mg tablet Take 40 mg by mouth daily.  aspirin 81 mg tablet Take 81 mg by mouth.  quinapril (ACCUPRIL) 40 mg tablet Take 40 mg by mouth daily.  simvastatin (ZOCOR) 40 mg tablet Take  by mouth nightly.  multivitamin (ONE A DAY) tablet Take 1 Tab by mouth daily. Review of Systems  Constitutional: The patient has no acute distress or discomfort. HEENT: The patient denies recent head trauma, eye pain, blurred vision,  hearing deficit, oropharyngeal mucosal pain or lesions, and the patient denies throat pain or discomfort. Lymphatics: The patient denies palpable peripheral lymphadenopathy. Hematologic: The patient denies having bruising, bleeding, or progressive fatigue. Respiratory: Patient denies having shortness of breath, cough, sputum production, fever, or dyspnea on exertion. Cardiovascular: The patient denies having leg pain, leg swelling, heart palpitations, chest permit, chest pain, or lightheadedness. The patient denies having dyspnea on exertion. Gastrointestinal: The patient denies having nausea, emesis, or diarrhea. The patient denies having any hematemesis or blood in the stool. Genitourinary: Patient denies having urinary urgency, frequency, or dysuria. The patient denies having blood in the urine. Psychological: The patient denies having symptoms of nervousness, anxiety, depression, or thoughts of harming himself some of this. Skin: Patient denies having skin rashes, skin, ulcerations, or unexplained itching or pruritus. Musculoskeletal: The patient denies having pain in the joints or bones. Objective:     Visit Vitals  /72   Pulse 79   Temp 98 °F (36.7 °C) (Oral)   Resp 16   Ht 5' 4\" (1.626 m)   Wt 63.8 kg (140 lb 9.6 oz)   SpO2 98%   BMI 24.13 kg/m²     ECOG 0 Pain score 0/10  Physical Exam:   Gen. Appearance: The patient is in no acute distress.   Skin: no bruise or rashes  HEENT: The exam is unremarkable. Neck: Supple without lymphadenopathy or thyromegaly. Lungs: Clear to auscultation and percussion; there are no wheezes or rhonchi. Heart: Regular rate and rhythm; there are no murmurs, gallops, or rubs. Abdomen: Bowel sounds are present and normal.  There is no guarding, tenderness, or hepatosplenomegaly. Extremities: There is no clubbing, cyanosis, or edema. Neurologic: There are no focal neurologic deficits. Lymphatics: There is no palpable peripheral lymphadenopathy. Lab data:      Results for orders placed or performed during the hospital encounter of 11/07/18   CBC WITH 3 PART DIFF     Status: Abnormal   Result Value Ref Range Status    WBC 11.3 4.5 - 13.0 K/uL Final    RBC 3.38 (L) 4.10 - 5.10 M/uL Final    HGB 10.4 (L) 12.0 - 16.0 g/dL Final    HCT 30.4 (L) 36 - 48 % Final    MCV 89.9 78 - 102 FL Final    MCH 30.8 25.0 - 35.0 PG Final    MCHC 34.2 31 - 37 g/dL Final    RDW 11.9 11.5 - 14.5 % Final    PLATELET 745 045 - 650 K/uL Final    NEUTROPHILS 75 (H) 40 - 70 % Final    MIXED CELLS 8 0.1 - 17 % Final    LYMPHOCYTES 18 14 - 44 % Final    ABS. NEUTROPHILS 8.5 1.8 - 9.5 K/UL Final    ABS. MIXED CELLS 0.8 0.0 - 2.3 K/uL Final    ABS. LYMPHOCYTES 2.0 1.1 - 5.9 K/UL Final     Comment: Test performed at [address]. Results Reviewed by Medical Director. DF AUTOMATED   Final           Assessment:     1. MDS (myelodysplastic syndrome), low grade ([facility]    2. Chronic anemia    3. Thyroid mass          Plan:     MDS/refractory anemia: WBC is normal today at 11.3, the hemoglobin is 10.4 g/dL with hematocrit of 30.4%. She is not a candidate for Procrit at this time. Chronic anemia: The current hemoglobin hematocrit 10.4 g/dL and 30.4 % respectively. I will check her iron profile levels at this time if the ferritin level declines below 25 ng/mL she will be offered replenishment therapy with intravenous iron.     Thyroid mass: Since her last clinic visit, the patient has undergone partial thyroidectomy with removal of a benign lesion in her thyroid. She does have a long-standing history of hypothyroidism and is currently on hormone replacement therapy. We will see her for routine follow up in 3 months or sooner if indicated. Orders Placed This Encounter    COMPLETE CBC & AUTO DIFF WBC    InHouse CBC ([facility])     Standing Status:   Future     Number of Occurrences:   1     Standing Expiration Date:   13/04/0737    METABOLIC PANEL, COMPREHENSIVE     Standing Status:   Future     Number of Occurrences:   1     Standing Expiration Date:   11/8/2019    IRON     Standing Status:   Future     Number of Occurrences:   1     Standing Expiration Date:   11/8/2019    FERRITIN     Standing Status:   Future     Number of Occurrences:   1     Standing Expiration Date:   11/8/2019       Alexis Cuba MD  8/2/2018

## 2018-11-07 NOTE — PATIENT INSTRUCTIONS
Myelodysplastic Syndromes: Care Instructions  Your Care Instructions  Myelodysplastic syndromes, also called MDS, are a group of rare conditions in which the bone marrow does not make enough healthy blood cells. Normally, the bone marrow makes red blood cells, white blood cells, and platelets. These cells carry oxygen in the blood, help the body fight infections, and help the blood clot. With MDS, you may feel weak and tired, get infections often, and bruise easily, although symptoms tend to vary. MDS is a form of blood cancer. In some cases, MDS can turn into acute myeloid leukemia (AML), another type of cancer. Some people develop MDS after treatment for cancer or exposure to pesticides or other chemicals. But in most cases, the cause of MDS is not known. Your doctor will use the results of blood tests to guide your treatment. There are many types of MDS, with different treatment plans for each. If you have enough red blood cells and are feeling all right, you may not need active treatment, but you and your doctor will want to watch your condition carefully. If you start feeling lightheaded and have no energy, you may need a blood transfusion. Your doctor also may give you antibiotics to prevent or treat infection. Follow-up care is a key part of your treatment and safety. Be sure to make and go to all appointments, and call your doctor if you are having problems. It's also a good idea to know your test results and keep a list of the medicines you take. How can you care for yourself at home? · Take your medicines exactly as prescribed. Call your doctor if you think you are having a problem with your medicine. You will get more details on the specific medicines your doctor prescribes. · If your doctor prescribed antibiotics, take them as directed. Do not stop taking them just because you feel better. You need to take the full course of antibiotics.  If you have side effects from antibiotics, tell your doctor. · Take steps to control your stress and workload. Learn relaxation techniques. ? Share your feelings. Stress and tension affect our emotions. By expressing your feelings to others, you may be able to understand and cope with them. ? Consider joining a support group. Talking about a problem with your spouse, a good friend, or other people with similar problems is a good way to reduce tension and stress. ? Express yourself with art. Try writing, crafts, dance, or art to relieve stress. ? Be kind to your body and mind. Getting enough sleep, eating a healthy diet, and taking time to do things you enjoy can contribute to an overall feeling of balance in your life and help reduce stress. ? Get help if you need it. Discuss your concerns with your doctor or counselor. · Do not smoke. Smoking can make blood problems worse. If you need help quitting, talk to your doctor about stop-smoking programs and medicines. These can increase your chances of quitting for good. · If you have not already done so, prepare a list of advance directives. Advance directives are instructions to your doctor and family members about what kind of care you want if you become unable to speak or express yourself. · Call the [employer] (7-396.628.9025) or visit its website at [address] [employer] [address] for more information. When should you call for help? Call 911 anytime you think you may need emergency care.  For example, call if:    · You passed out (lost consciousness).    Call your doctor now or seek immediate medical care if:    · You have a fever.     · You have abnormal bleeding.     · You have new or worse pain.     · You think you have an infection.     · You have new symptoms, such as a cough, belly pain, vomiting, diarrhea, or a rash.    Watch closely for changes in your health, and be sure to contact your doctor if:    · You are much more tired than usual.     · You have swollen glands in your armpits, groin, or neck.     · You do not get better as expected. Where can you learn more? Go to http://bindu-apollo.info/. Enter Kiersten Lee in the search box to learn more about \"Myelodysplastic Syndromes: Care Instructions. \"  Current as of: March 28, 2018  Content Version: 11.8  © 0204-9552 [employer]. Care instructions adapted under license by [employer] (which disclaims liability or warranty for this information). If you have questions about a medical condition or this instruction, always ask your healthcare professional. [address] any warranty or liability for your use of this information.

## 2018-11-08 LAB
ALBUMIN SERPL-MCNC: 4.5 G/DL (ref 3.5–4.8)
ALBUMIN/GLOB SERPL: 1.6 {RATIO} (ref 1.2–2.2)
ALP SERPL-CCNC: 99 IU/L (ref 39–117)
ALT SERPL-CCNC: 19 IU/L (ref 0–32)
AST SERPL-CCNC: 20 IU/L (ref 0–40)
BILIRUB SERPL-MCNC: 0.4 MG/DL (ref 0–1.2)
BUN SERPL-MCNC: 22 MG/DL (ref 8–27)
BUN/CREAT SERPL: 17 (ref 12–28)
CALCIUM SERPL-MCNC: 9.5 MG/DL (ref 8.7–10.3)
CHLORIDE SERPL-SCNC: 100 MMOL/L (ref 96–106)
CO2 SERPL-SCNC: 28 MMOL/L (ref 20–29)
CREAT SERPL-MCNC: 1.32 MG/DL (ref 0.57–1)
FERRITIN SERPL-MCNC: 160 NG/ML (ref 15–150)
GLOBULIN SER CALC-MCNC: 2.8 G/DL (ref 1.5–4.5)
GLUCOSE SERPL-MCNC: 194 MG/DL (ref 65–99)
IRON SERPL-MCNC: 57 UG/DL (ref 27–139)
POTASSIUM SERPL-SCNC: 4.2 MMOL/L (ref 3.5–5.2)
PROT SERPL-MCNC: 7.3 G/DL (ref 6–8.5)
SODIUM SERPL-SCNC: 140 MMOL/L (ref 134–144)

## 2018-11-28 ENCOUNTER — HOSPITAL ENCOUNTER (OUTPATIENT)
Dept: ONCOLOGY | Age: 73
Discharge: HOME | End: 2018-11-28

## 2018-11-28 ENCOUNTER — CLINICAL SUPPORT (OUTPATIENT)
Dept: ONCOLOGY | Age: 73
End: 2018-11-28

## 2018-11-28 ENCOUNTER — HOSPITAL ENCOUNTER (OUTPATIENT)
Dept: INFUSION THERAPY | Age: 73
Discharge: HOME | End: 2018-11-28
Payer: MEDICARE

## 2018-11-28 VITALS
TEMPERATURE: 97.9 F | RESPIRATION RATE: 16 BRPM | SYSTOLIC BLOOD PRESSURE: 125 MMHG | HEART RATE: 77 BPM | DIASTOLIC BLOOD PRESSURE: 71 MMHG

## 2018-11-28 DIAGNOSIS — D46.9 MDS (MYELODYSPLASTIC SYNDROME) (HCC): ICD-10-CM

## 2018-11-28 DIAGNOSIS — D46.9 MDS (MYELODYSPLASTIC SYNDROME) (HCC): Primary | ICD-10-CM

## 2018-11-28 LAB
BASO+EOS+MONOS # BLD AUTO: 0.5 K/UL (ref 0–2.3)
BASO+EOS+MONOS # BLD AUTO: 10 % (ref 0.1–17)
DIFFERENTIAL METHOD BLD: ABNORMAL
ERYTHROCYTE [DISTWIDTH] IN BLOOD BY AUTOMATED COUNT: 12.3 % (ref 11.5–14.5)
HCT VFR BLD AUTO: 30.2 % (ref 36–48)
HGB BLD-MCNC: 10.2 G/DL (ref 12–16)
LYMPHOCYTES # BLD: 1.8 K/UL (ref 1.1–5.9)
LYMPHOCYTES NFR BLD: 36 % (ref 14–44)
MCH RBC QN AUTO: 30.4 PG (ref 25–35)
MCHC RBC AUTO-ENTMCNC: 33.8 G/DL (ref 31–37)
MCV RBC AUTO: 90.1 FL (ref 78–102)
NEUTS SEG # BLD: 2.7 K/UL (ref 1.8–9.5)
NEUTS SEG NFR BLD: 54 % (ref 40–70)
PLATELET # BLD AUTO: 159 K/UL (ref 140–440)
RBC # BLD AUTO: 3.35 M/UL (ref 4.1–5.1)
WBC # BLD AUTO: 5 K/UL (ref 4.5–13)

## 2018-11-28 PROCEDURE — 36415 COLL VENOUS BLD VENIPUNCTURE: CPT

## 2018-11-28 NOTE — PROGRESS NOTES
[facility] Progress Note Date: 2018 Name: Wendy Covington MRN: [id number] : 1945 Ms. Linda Curry arrived in the [facility] today at [phone], in stable condition, here for Q 3 Week, CBC/Procrit injection. She was assessed and education was provided. Ms. Roscoe German vitals were reviewed. Visit Vitals /71 (BP 1 Location: Right arm, BP Patient Position: Sitting) Pulse 77 Temp 97.9 °F (36.6 °C) Resp 16 Breastfeeding? No  
 
 
 
Blood for a CBC was drawn from her left AC at 1608, per order, and without incident. Lab results were obtained and reviewed. Recent Results (from the past 12 hour(s)) CBC WITH 3 PART DIFF Collection Time: 18  4:08 PM  
Result Value Ref Range WBC 5.0 4.5 - 13.0 K/uL  
 RBC 3.35 (L) 4.10 - 5.10 M/uL  
 HGB 10.2 (L) 12.0 - 16.0 g/dL HCT 30.2 (L) 36 - 48 % MCV 90.1 78 - 102 FL  
 MCH 30.4 25.0 - 35.0 PG  
 MCHC 33.8 31 - 37 g/dL  
 RDW 12.3 11.5 - 14.5 % PLATELET 844 731 - 480 K/uL NEUTROPHILS 54 40 - 70 % MIXED CELLS 10 0.1 - 17 % LYMPHOCYTES 36 14 - 44 % ABS. NEUTROPHILS 2.7 1.8 - 9.5 K/UL  
 ABS. MIXED CELLS 0.5 0.0 - 2.3 K/uL  
 ABS. LYMPHOCYTES 1.8 1.1 - 5.9 K/UL  
 DF AUTOMATED Procrit injection was HELD today, per order, for HGB 10.2 & HCT 30.2, and because her insurance company has  denied authorization for the Procrit injection at this time. Ms. Linda Curry tolerated well, and had no complaints. Ms. Linda Curry was discharged from [place] [address] in stable condition at [phone]. Clay Angry She is to return in 3 weeks, on Wednesday, 18,  at 1600,  for her next appointment, for CBC & possible Procrit injection. Lan Pritchard RN 2018 
1:39 PM

## 2018-12-19 ENCOUNTER — CLINICAL SUPPORT (OUTPATIENT)
Dept: ONCOLOGY | Age: 73
End: 2018-12-19

## 2018-12-19 ENCOUNTER — HOSPITAL ENCOUNTER (OUTPATIENT)
Dept: INFUSION THERAPY | Age: 73
Discharge: HOME | End: 2018-12-19
Payer: MEDICARE

## 2018-12-19 ENCOUNTER — HOSPITAL ENCOUNTER (OUTPATIENT)
Dept: ONCOLOGY | Age: 73
Discharge: HOME | End: 2018-12-19

## 2018-12-19 VITALS
DIASTOLIC BLOOD PRESSURE: 81 MMHG | TEMPERATURE: 98.1 F | HEART RATE: 100 BPM | OXYGEN SATURATION: 100 % | SYSTOLIC BLOOD PRESSURE: 141 MMHG | RESPIRATION RATE: 16 BRPM

## 2018-12-19 DIAGNOSIS — D46.9 MDS (MYELODYSPLASTIC SYNDROME) (HCC): ICD-10-CM

## 2018-12-19 DIAGNOSIS — D46.9 MDS (MYELODYSPLASTIC SYNDROME) (HCC): Primary | ICD-10-CM

## 2018-12-19 LAB
BASO+EOS+MONOS # BLD AUTO: 0.4 K/UL (ref 0–2.3)
BASO+EOS+MONOS # BLD AUTO: 8 % (ref 0.1–17)
DIFFERENTIAL METHOD BLD: ABNORMAL
ERYTHROCYTE [DISTWIDTH] IN BLOOD BY AUTOMATED COUNT: 12.3 % (ref 11.5–14.5)
HCT VFR BLD AUTO: 30.7 % (ref 36–48)
HGB BLD-MCNC: 10.3 G/DL (ref 12–16)
LYMPHOCYTES # BLD: 1.5 K/UL (ref 1.1–5.9)
LYMPHOCYTES NFR BLD: 29 % (ref 14–44)
MCH RBC QN AUTO: 30.3 PG (ref 25–35)
MCHC RBC AUTO-ENTMCNC: 33.6 G/DL (ref 31–37)
MCV RBC AUTO: 90.3 FL (ref 78–102)
NEUTS SEG # BLD: 3.5 K/UL (ref 1.8–9.5)
NEUTS SEG NFR BLD: 63 % (ref 40–70)
PLATELET # BLD AUTO: 177 K/UL (ref 140–440)
RBC # BLD AUTO: 3.4 M/UL (ref 4.1–5.1)
WBC # BLD AUTO: 5.4 K/UL (ref 4.5–13)

## 2018-12-19 PROCEDURE — 36415 COLL VENOUS BLD VENIPUNCTURE: CPT

## 2018-12-19 NOTE — PROGRESS NOTES
[facility] Progress Note    Date: 2018    Name: Ilir Cruz    MRN: [id number]         : 1945      Ms. Shayan Ch arrived to [facility] at 1110. Ms. Shayan Ch was assessed and education was provided. Ms. Parul Rosas vitals were reviewed. Visit Vitals  /81 (BP 1 Location: Right arm, BP Patient Position: Sitting)   Pulse 100   Temp 98.1 °F (36.7 °C)   Resp 16   SpO2 100%   Breastfeeding? No       Blood drawn for labs via Left antecubital  venipuncture x one attempt. Lab results were obtained and reviewed. No results found for this or any previous visit (from the past 12 hour(s)). Hgb 10.3 and Hct 30.7. Procrit was held today. Ms. Shayan Ch tolerated well without complaints. Ms. Shayan Ch was discharged from [place] [address] in stable condition at 1125. She is to return on 19 for her next appointment.     Terrence Dalal RN  2018

## 2019-01-09 ENCOUNTER — CLINICAL SUPPORT (OUTPATIENT)
Dept: ONCOLOGY | Age: 74
End: 2019-01-09

## 2019-01-09 ENCOUNTER — HOSPITAL ENCOUNTER (OUTPATIENT)
Dept: INFUSION THERAPY | Age: 74
Discharge: HOME | End: 2019-01-09
Payer: MEDICARE

## 2019-01-09 ENCOUNTER — HOSPITAL ENCOUNTER (OUTPATIENT)
Dept: ONCOLOGY | Age: 74
Discharge: HOME | End: 2019-01-09

## 2019-01-09 VITALS
TEMPERATURE: 97.4 F | SYSTOLIC BLOOD PRESSURE: 128 MMHG | RESPIRATION RATE: 18 BRPM | DIASTOLIC BLOOD PRESSURE: 81 MMHG | OXYGEN SATURATION: 96 % | HEART RATE: 74 BPM

## 2019-01-09 DIAGNOSIS — D46.9 MDS (MYELODYSPLASTIC SYNDROME) (HCC): Primary | ICD-10-CM

## 2019-01-09 DIAGNOSIS — D46.9 MDS (MYELODYSPLASTIC SYNDROME) (HCC): ICD-10-CM

## 2019-01-09 LAB
BASO+EOS+MONOS # BLD AUTO: 0.4 K/UL (ref 0–2.3)
BASO+EOS+MONOS NFR BLD AUTO: 8 % (ref 0.1–17)
DIFFERENTIAL METHOD BLD: ABNORMAL
ERYTHROCYTE [DISTWIDTH] IN BLOOD BY AUTOMATED COUNT: 12.2 % (ref 11.5–14.5)
HCT VFR BLD AUTO: 33.2 % (ref 36–48)
HGB BLD-MCNC: 11 G/DL (ref 12–16)
LYMPHOCYTES # BLD: 1.9 K/UL (ref 1.1–5.9)
LYMPHOCYTES NFR BLD: 33 % (ref 14–44)
MCH RBC QN AUTO: 30.3 PG (ref 25–35)
MCHC RBC AUTO-ENTMCNC: 33.1 G/DL (ref 31–37)
MCV RBC AUTO: 91.5 FL (ref 78–102)
NEUTS SEG # BLD: 3.4 K/UL (ref 1.8–9.5)
NEUTS SEG NFR BLD: 60 % (ref 40–70)
PLATELET # BLD AUTO: 200 K/UL (ref 140–440)
RBC # BLD AUTO: 3.63 M/UL (ref 4.1–5.1)
WBC # BLD AUTO: 5.7 K/UL (ref 4.5–13)

## 2019-01-09 PROCEDURE — 36415 COLL VENOUS BLD VENIPUNCTURE: CPT

## 2019-01-09 NOTE — PROGRESS NOTES
[facility] Progress Note Date: 2019 Name: Wolfgang Howard MRN: [id number] : 1945 Ms. Marnie Shaikh arrived to [facility] at 1500. Ms. Marnie Shaikh was assessed and education was provided. Ms. Zeferino Hearn vitals were reviewed. Visit Vitals /81 (BP 1 Location: Left arm, BP Patient Position: Sitting) Pulse 74 Temp 97.4 °F (36.3 °C) Resp 18 SpO2 96% Breastfeeding? No  
 
 
Blood drawn for labs via Left antecubital  venipuncture x one attempt. Lab results were obtained and reviewed. Recent Results (from the past 12 hour(s)) CBC WITH 3 PART DIFF Collection Time: 19  3:06 PM  
Result Value Ref Range WBC 5.7 4.5 - 13.0 K/uL  
 RBC 3.63 (L) 4.10 - 5.10 M/uL  
 HGB 11.0 (L) 12.0 - 16.0 g/dL HCT 33.2 (L) 36 - 48 % MCV 91.5 78 - 102 FL  
 MCH 30.3 25.0 - 35.0 PG  
 MCHC 33.1 31 - 37 g/dL  
 RDW 12.2 11.5 - 14.5 % PLATELET 933 595 - 742 K/uL NEUTROPHILS 60 40 - 70 % MIXED CELLS 8 0.1 - 17 % LYMPHOCYTES 33 14 - 44 % ABS. NEUTROPHILS 3.4 1.8 - 9.5 K/UL  
 ABS. MIXED CELLS 0.4 0.0 - 2.3 K/uL  
 ABS. LYMPHOCYTES 1.9 1.1 - 5.9 K/UL  
 DF AUTOMATED Hgb 11 and Hct 33.2. Procrit was held today. Ms. Marnie Shaikh tolerated well without complaints. Ms. Marnie Shaikh was discharged from [place] [address] in stable condition at 1520. She is to return on 19 for her next appointment. Boubacar Lazo RN 
2019

## 2019-01-30 ENCOUNTER — CLINICAL SUPPORT (OUTPATIENT)
Dept: ONCOLOGY | Age: 74
End: 2019-01-30

## 2019-01-30 ENCOUNTER — HOSPITAL ENCOUNTER (OUTPATIENT)
Dept: INFUSION THERAPY | Age: 74
Discharge: HOME | End: 2019-01-30
Payer: MEDICARE

## 2019-01-30 ENCOUNTER — HOSPITAL ENCOUNTER (OUTPATIENT)
Dept: ONCOLOGY | Age: 74
Discharge: HOME | End: 2019-01-30

## 2019-01-30 VITALS
DIASTOLIC BLOOD PRESSURE: 70 MMHG | TEMPERATURE: 97.7 F | HEART RATE: 69 BPM | RESPIRATION RATE: 20 BRPM | SYSTOLIC BLOOD PRESSURE: 142 MMHG

## 2019-01-30 DIAGNOSIS — D46.9 MDS (MYELODYSPLASTIC SYNDROME) (HCC): Primary | ICD-10-CM

## 2019-01-30 DIAGNOSIS — D46.9 MDS (MYELODYSPLASTIC SYNDROME) (HCC): ICD-10-CM

## 2019-01-30 LAB
BASO+EOS+MONOS # BLD AUTO: 0.8 K/UL (ref 0–2.3)
BASO+EOS+MONOS NFR BLD AUTO: 11 % (ref 0.1–17)
DIFFERENTIAL METHOD BLD: ABNORMAL
ERYTHROCYTE [DISTWIDTH] IN BLOOD BY AUTOMATED COUNT: 12.2 % (ref 11.5–14.5)
HCT VFR BLD AUTO: 32 % (ref 36–48)
HGB BLD-MCNC: 10.7 G/DL (ref 12–16)
LYMPHOCYTES # BLD: 1.5 K/UL (ref 1.1–5.9)
LYMPHOCYTES NFR BLD: 19 % (ref 14–44)
MCH RBC QN AUTO: 30.2 PG (ref 25–35)
MCHC RBC AUTO-ENTMCNC: 33.4 G/DL (ref 31–37)
MCV RBC AUTO: 90.4 FL (ref 78–102)
NEUTS SEG # BLD: 5.4 K/UL (ref 1.8–9.5)
NEUTS SEG NFR BLD: 71 % (ref 40–70)
PLATELET # BLD AUTO: 194 K/UL (ref 140–440)
RBC # BLD AUTO: 3.54 M/UL (ref 4.1–5.1)
WBC # BLD AUTO: 7.7 K/UL (ref 4.5–13)

## 2019-01-30 PROCEDURE — 36415 COLL VENOUS BLD VENIPUNCTURE: CPT

## 2019-01-30 NOTE — PROGRESS NOTES
[facility] Progress Note Date: 2019 Name: Ana Ontiveros MRN: [id number] : 1945 Ms. Chapo Boucher arrived in the [facility] today at 1100, in stable condition, here for Q 3 Week, CBC/Procrit injection. She was assessed and education was provided. Ms. Ariana Che vitals were reviewed. Visit Vitals /70 (BP 1 Location: Right arm, BP Patient Position: Sitting) Pulse 69 Temp 97.7 °F (36.5 °C) Resp 20 Blood for a CBC was drawn from her left AC at 1110, per order, and without incident. Lab results were obtained and reviewed. Recent Results (from the past 12 hour(s)) CBC WITH 3 PART DIFF Collection Time: 19 11:10 AM  
Result Value Ref Range WBC 7.7 4.5 - 13.0 K/uL  
 RBC 3.54 (L) 4.10 - 5.10 M/uL  
 HGB 10.7 (L) 12.0 - 16.0 g/dL HCT 32.0 (L) 36 - 48 % MCV 90.4 78 - 102 FL  
 MCH 30.2 25.0 - 35.0 PG  
 MCHC 33.4 31 - 37 g/dL  
 RDW 12.2 11.5 - 14.5 % PLATELET 967 242 - 060 K/uL NEUTROPHILS 71 (H) 40 - 70 % MIXED CELLS 11 0.1 - 17 % LYMPHOCYTES 19 14 - 44 % ABS. NEUTROPHILS 5.4 1.8 - 9.5 K/UL  
 ABS. MIXED CELLS 0.8 0.0 - 2.3 K/uL  
 ABS. LYMPHOCYTES 1.5 1.1 - 5.9 K/UL  
 DF AUTOMATED Procrit injection was HELD today, per order, for HGB 10.7 & HCT 32.0, and because her insurance company has denied authorization for the Procrit injection at this time. Ms. Chapo Boucher tolerated well, and had no complaints. Ms. Chapo Boucher was discharged from [place] [address] in stable condition at 1120. Dora Winters She is to return in 3 weeks, on Wednesday, 19,  at 1100,  for her next appointment, for CBC & possible Procrit injection.   
 
Milagro Palomares RN 
2019 
1:39 PM

## 2019-02-07 ENCOUNTER — HOSPITAL ENCOUNTER (OUTPATIENT)
Dept: ONCOLOGY | Age: 74
Discharge: HOME | End: 2019-02-07

## 2019-02-07 ENCOUNTER — HOSPITAL ENCOUNTER (OUTPATIENT)
Dept: LAB | Age: 74
Discharge: HOME | End: 2019-02-07
Payer: MEDICARE

## 2019-02-07 ENCOUNTER — OFFICE VISIT (OUTPATIENT)
Dept: ONCOLOGY | Age: 74
End: 2019-02-07

## 2019-02-07 VITALS
TEMPERATURE: 97.3 F | WEIGHT: 140 LBS | DIASTOLIC BLOOD PRESSURE: 82 MMHG | OXYGEN SATURATION: 100 % | HEART RATE: 78 BPM | HEIGHT: 64 IN | BODY MASS INDEX: 23.9 KG/M2 | SYSTOLIC BLOOD PRESSURE: 150 MMHG | RESPIRATION RATE: 16 BRPM

## 2019-02-07 DIAGNOSIS — D46.20 MDS (MYELODYSPLASTIC SYNDROME), LOW GRADE (HCC): Primary | ICD-10-CM

## 2019-02-07 DIAGNOSIS — D64.9 CHRONIC ANEMIA: ICD-10-CM

## 2019-02-07 DIAGNOSIS — E07.9 THYROID MASS: ICD-10-CM

## 2019-02-07 DIAGNOSIS — D46.20 MDS (MYELODYSPLASTIC SYNDROME), LOW GRADE (HCC): ICD-10-CM

## 2019-02-07 LAB
BASO+EOS+MONOS # BLD AUTO: 0.9 K/UL (ref 0–2.3)
BASO+EOS+MONOS NFR BLD AUTO: 12 % (ref 0.1–17)
DIFFERENTIAL METHOD BLD: ABNORMAL
ERYTHROCYTE [DISTWIDTH] IN BLOOD BY AUTOMATED COUNT: 12.3 % (ref 11.5–14.5)
HCT VFR BLD AUTO: 32.7 % (ref 36–48)
HGB BLD-MCNC: 11.1 G/DL (ref 12–16)
LYMPHOCYTES # BLD: 1.6 K/UL (ref 1.1–5.9)
LYMPHOCYTES NFR BLD: 22 % (ref 14–44)
MCH RBC QN AUTO: 30.8 PG (ref 25–35)
MCHC RBC AUTO-ENTMCNC: 33.9 G/DL (ref 31–37)
MCV RBC AUTO: 90.8 FL (ref 78–102)
NEUTS SEG # BLD: 4.6 K/UL (ref 1.8–9.5)
NEUTS SEG NFR BLD: 66 % (ref 40–70)
PLATELET # BLD AUTO: 199 K/UL (ref 140–440)
RBC # BLD AUTO: 3.6 M/UL (ref 4.1–5.1)
WBC # BLD AUTO: 7.1 K/UL (ref 4.5–13)

## 2019-02-07 PROCEDURE — 99001 SPECIMEN HANDLING PT-LAB: CPT

## 2019-02-07 NOTE — PATIENT INSTRUCTIONS
Myelodysplastic Syndromes: Care Instructions  Your Care Instructions  Myelodysplastic syndromes, also called MDS, are a group of rare conditions in which the bone marrow does not make enough healthy blood cells. Normally, the bone marrow makes red blood cells, white blood cells, and platelets. These cells carry oxygen in the blood, help the body fight infections, and help the blood clot. With MDS, you may feel weak and tired, get infections often, and bruise easily, although symptoms tend to vary. MDS is a form of blood cancer. In some cases, MDS can turn into acute myeloid leukemia (AML), another type of cancer. Some people develop MDS after treatment for cancer or exposure to pesticides or other chemicals. But in most cases, the cause of MDS is not known. Your doctor will use the results of blood tests to guide your treatment. There are many types of MDS, with different treatment plans for each. If you have enough red blood cells and are feeling all right, you may not need active treatment, but you and your doctor will want to watch your condition carefully. If you start feeling lightheaded and have no energy, you may need a blood transfusion. Your doctor also may give you antibiotics to prevent or treat infection. Follow-up care is a key part of your treatment and safety. Be sure to make and go to all appointments, and call your doctor if you are having problems. It's also a good idea to know your test results and keep a list of the medicines you take. How can you care for yourself at home? · Take your medicines exactly as prescribed. Call your doctor if you think you are having a problem with your medicine. You will get more details on the specific medicines your doctor prescribes. · If your doctor prescribed antibiotics, take them as directed. Do not stop taking them just because you feel better. You need to take the full course of antibiotics.  If you have side effects from antibiotics, tell your doctor. · Take steps to control your stress and workload. Learn relaxation techniques. ? Share your feelings. Stress and tension affect our emotions. By expressing your feelings to others, you may be able to understand and cope with them. ? Consider joining a support group. Talking about a problem with your spouse, a good friend, or other people with similar problems is a good way to reduce tension and stress. ? Express yourself with art. Try writing, crafts, dance, or art to relieve stress. ? Be kind to your body and mind. Getting enough sleep, eating a healthy diet, and taking time to do things you enjoy can contribute to an overall feeling of balance in your life and help reduce stress. ? Get help if you need it. Discuss your concerns with your doctor or counselor. · Do not smoke. Smoking can make blood problems worse. If you need help quitting, talk to your doctor about stop-smoking programs and medicines. These can increase your chances of quitting for good. · If you have not already done so, prepare a list of advance directives. Advance directives are instructions to your doctor and family members about what kind of care you want if you become unable to speak or express yourself. · Call the [employer] (5-546.351.5025) or visit its website at [address] for more information. When should you call for help? Call 911 anytime you think you may need emergency care.  For example, call if:    · You passed out (lost consciousness).    Call your doctor now or seek immediate medical care if:    · You have a fever.     · You have abnormal bleeding.     · You have new or worse pain.     · You think you have an infection.     · You have new symptoms, such as a cough, belly pain, vomiting, diarrhea, or a rash.    Watch closely for changes in your health, and be sure to contact your doctor if:    · You are much more tired than usual.     · You have swollen glands in your armpits, groin, or neck.     · You do not get better as expected. Where can you learn more? Go to http://bindu-apollo.info/. Enter Cecil Morales in the search box to learn more about \"Myelodysplastic Syndromes: Care Instructions. \"  Current as of: March 27, 2018  Content Version: 11.9  © 5410-4776 [employer]. Care instructions adapted under license by [employer] (which disclaims liability or warranty for this information). If you have questions about a medical condition or this instruction, always ask your healthcare professional. [address] any warranty or liability for your use of this information.

## 2019-02-07 NOTE — PROGRESS NOTES
Hematology/Oncology  Progress Note    Name: Astrid Mate  Date: 2019  : 1945    PCP: Dr. Campos Solano    Ms. Juan Luis Williamson is a 75 y.o.  female with refractory anemia/early MDS. She had a bone marrow biopsy on 13 that revealed no evidence of hematopoetic neoplasm or dysplasia. She has present storage iron and normal female karyotype. Current Therapy: CBC Q3 months, EDUARDO therapy will be warranted if h/h drops below 10 and 30 respectively. Subjective:     Ms. Juan Luis Williamson is a 76-year-old woman with myelodysplastic syndrome and refractory anemia. The patient presents for routine 3 month follow up. The pt denies SOB, CP, unexplained fevers or unexplained weight changes. Her appetite and energy level is reasonably good. She states she has change her diet and eating more healthy foods. She continues to take her iron once daily. She reports that she has no new complaints or concerns to report.   Past Medical History:   Diagnosis Date    Allergic rhinitis     Anemia     Anemia 2018    Anemia NEC     Arthritis     Blurred vision     Cardiac defibrillator in place     Diabetes mellitus ([address]     Dyspepsia and other specified disorders of function of stomach     GERD (gastroesophageal reflux disease)     Heart attack ([address]     Heart disease     Hemorrhoid     Hyperlipemia     Hypertension     Ill-defined condition     Myeloblastic Syndrome    Joint pain     LBBB (left bundle branch block)     MDS (myelodysplastic syndrome) (HCC)     Myelodysplastic syndrome, low grade ([address] 2018    Pacemaker     Pacemaker     Sinus problem     SOB (shortness of breath)     Thyroid disorder     Vitamin D deficiency      Past Surgical History:   Procedure Laterality Date    HX CATARACT REMOVAL Bilateral     HX CHOLECYSTECTOMY      HX HYSTERECTOMY      HX PACEMAKER      [employer]    HX PACEMAKER PLACEMENT      HX THYROIDECTOMY       Social History     Socioeconomic History    Marital status:      Spouse name: Not on file    Number of children: Not on file    Years of education: Not on file    Highest education level: Not on file   Social Needs    Financial resource strain: Not on file    Food insecurity - worry: Not on file    Food insecurity - inability: Not on file    Transportation needs - medical: Not on file   [employer] needs - non-medical: Not on file   Occupational History    Not on file   Tobacco Use    Smoking status: Never Smoker    Smokeless tobacco: Never Used   Substance and Sexual Activity    Alcohol use: No    Drug use: No    Sexual activity: Not on file   Other Topics Concern    Not on file   Social History Narrative    Not on file     Family History   Problem Relation Age of Onset    Diabetes Mother     Diabetes Father     Cancer Father     Heart Disease Father     Hypertension Father     Diabetes Sister     Hypertension Sister     Stroke Sister     Diabetes Sister     Hypertension Sister      Current Outpatient Medications   Medication Sig Dispense Refill    methocarbamol (ROBAXIN) 750 mg tablet Take 1 Tab by mouth three (3) times daily. 15 Tab 0    nitroglycerin (NITROSTAT) 0.4 mg SL tablet 1 Tab by SubLINGual route DIALYSIS PRN for Chest Pain. 20 Tab 0    glimepiride (AMARYL) 1 mg tablet Take 1 mg by mouth Daily (before breakfast).  Loratadine (CHILDREN'S CLARITIN) 5 mg chew Take 1 Tab by mouth daily.  levocetirizine (XYZAL) 5 mg tablet Take  by mouth daily.  LACTOBACILLUS ACIDOPHILUS (PROBIOTIC PO) Take  by mouth daily.  ascorbic acid, vitamin C, (VITAMIN C) 500 mg tablet Take  by mouth daily.  metFORMIN (GLUCOPHAGE) 500 mg tablet 250 mg daily as needed.  levothyroxine (SYNTHROID) 50 mcg tablet Take  by mouth Daily (before breakfast).  ergocalciferol (VITAMIN D2) 50,000 unit capsule Take 50,000 Units by mouth every fourteen (14) days.       carvedilol (COREG) 25 mg tablet Take 25 mg by mouth two (2) times daily (with meals).  pantoprazole (PROTONIX) 40 mg tablet Take 40 mg by mouth daily.  aspirin 81 mg tablet Take 81 mg by mouth.  quinapril (ACCUPRIL) 40 mg tablet Take 40 mg by mouth daily.  simvastatin (ZOCOR) 40 mg tablet Take  by mouth nightly.  multivitamin (ONE A DAY) tablet Take 1 Tab by mouth daily. Review of Systems  Constitutional: The patient has no acute distress or discomfort. HEENT: The patient denies recent head trauma, eye pain, blurred vision,  hearing deficit, oropharyngeal mucosal pain or lesions, and the patient denies throat pain or discomfort. Lymphatics: The patient denies palpable peripheral lymphadenopathy. Hematologic: The patient denies having bruising, bleeding, or progressive fatigue. Respiratory: Patient denies having shortness of breath, cough, sputum production, fever, or dyspnea on exertion. Cardiovascular: The patient denies having leg pain, leg swelling, heart palpitations, chest permit, chest pain, or lightheadedness. The patient denies having dyspnea on exertion. Gastrointestinal: The patient denies having nausea, emesis, or diarrhea. The patient denies having any hematemesis or blood in the stool. Genitourinary: Patient denies having urinary urgency, frequency, or dysuria. The patient denies having blood in the urine. Psychological: The patient denies having symptoms of nervousness, anxiety, depression, or thoughts of harming himself some of this. Skin: Patient denies having skin rashes, skin, ulcerations, or unexplained itching or pruritus. Musculoskeletal: The patient denies having pain in the joints or bones. Objective:     Visit Vitals  /82   Pulse 78   Temp 97.3 °F (36.3 °C) (Oral)   Resp 16   Ht 5' 4\" (1.626 m)   Wt 63.5 kg (140 lb)   SpO2 100%   BMI 24.03 kg/m²     ECOG 0 Pain score 0/10  Physical Exam:   Gen. Appearance: The patient is in no acute distress.   Skin: no bruise or rashes  HEENT: The exam is unremarkable. Neck: Supple without lymphadenopathy or thyromegaly. Lungs: Clear to auscultation and percussion; there are no wheezes or rhonchi. Heart: Regular rate and rhythm; there are no murmurs, gallops, or rubs. Abdomen: Bowel sounds are present and normal.  There is no guarding, tenderness, or hepatosplenomegaly. Extremities: There is no clubbing, cyanosis, or edema. Neurologic: There are no focal neurologic deficits. Lymphatics: There is no palpable peripheral lymphadenopathy. Lab data:      Results for orders placed or performed during the hospital encounter of 02/07/19   CBC WITH 3 PART DIFF     Status: Abnormal   Result Value Ref Range Status    WBC 7.1 4.5 - 13.0 K/uL Final    RBC 3.60 (L) 4.10 - 5.10 M/uL Final    HGB 11.1 (L) 12.0 - 16.0 g/dL Final    HCT 32.7 (L) 36 - 48 % Final    MCV 90.8 78 - 102 FL Final    MCH 30.8 25.0 - 35.0 PG Final    MCHC 33.9 31 - 37 g/dL Final    RDW 12.3 11.5 - 14.5 % Final    PLATELET 740 427 - 910 K/uL Final    NEUTROPHILS 66 40 - 70 % Final    MIXED CELLS 12 0.1 - 17 % Final    LYMPHOCYTES 22 14 - 44 % Final    ABS. NEUTROPHILS 4.6 1.8 - 9.5 K/UL Final    ABS. MIXED CELLS 0.9 0.0 - 2.3 K/uL Final    ABS. LYMPHOCYTES 1.6 1.1 - 5.9 K/UL Final     Comment: Test performed at [address]. Results Reviewed by Medical Director. DF AUTOMATED   Final           Assessment:     1. MDS (myelodysplastic syndrome), low grade ([facility]    2. Chronic anemia    3. Thyroid mass          Plan:     MDS/refractory anemia: WBC is normal today at 7.1, the hemoglobin is 11.1 g/dL with hematocrit of 32.7 %. She is not a candidate for Procrit at this time. Chronic anemia: The current hemoglobin hematocrit 11.1 g/dL and 32.7 % respectively. I will check her iron profile levels at this time if the ferritin level declines below 25 ng/mL she will be offered replenishment therapy with intravenous iron.     Thyroid mass: Since her last clinic visit, the patient has undergone partial thyroidectomy with removal of a benign lesion in her thyroid. She does have a long-standing history of hypothyroidism and is currently on hormone replacement therapy. We will see her for routine follow up in 3 months or sooner if indicated. Orders Placed This Encounter    COMPLETE CBC & AUTO DIFF WBC    InHouse CBC ([employer])     Standing Status:   Future     Number of Occurrences:   1     Standing Expiration Date:   2/14/2019       Jake Peralta MD  2/7/2019

## 2019-02-08 LAB
ALBUMIN SERPL-MCNC: 4.5 G/DL (ref 3.5–4.8)
ALBUMIN/GLOB SERPL: 1.5 {RATIO} (ref 1.2–2.2)
ALP SERPL-CCNC: 114 IU/L (ref 39–117)
ALT SERPL-CCNC: 18 IU/L (ref 0–32)
AST SERPL-CCNC: 20 IU/L (ref 0–40)
BILIRUB SERPL-MCNC: 0.5 MG/DL (ref 0–1.2)
BUN SERPL-MCNC: 13 MG/DL (ref 8–27)
BUN/CREAT SERPL: 12 (ref 12–28)
CALCIUM SERPL-MCNC: 9.6 MG/DL (ref 8.7–10.3)
CHLORIDE SERPL-SCNC: 101 MMOL/L (ref 96–106)
CO2 SERPL-SCNC: 24 MMOL/L (ref 20–29)
CREAT SERPL-MCNC: 1.07 MG/DL (ref 0.57–1)
FERRITIN SERPL-MCNC: 136 NG/ML (ref 15–150)
GLOBULIN SER CALC-MCNC: 3 G/DL (ref 1.5–4.5)
GLUCOSE SERPL-MCNC: 188 MG/DL (ref 65–99)
IRON SATN MFR SERPL: 19 % (ref 15–55)
IRON SERPL-MCNC: 51 UG/DL (ref 27–139)
POTASSIUM SERPL-SCNC: 4.4 MMOL/L (ref 3.5–5.2)
PROT SERPL-MCNC: 7.5 G/DL (ref 6–8.5)
SODIUM SERPL-SCNC: 141 MMOL/L (ref 134–144)
TIBC SERPL-MCNC: 273 UG/DL (ref 250–450)
UIBC SERPL-MCNC: 222 UG/DL (ref 118–369)

## 2019-02-20 ENCOUNTER — HOSPITAL ENCOUNTER (OUTPATIENT)
Dept: INFUSION THERAPY | Age: 74
End: 2019-02-20

## 2019-03-13 ENCOUNTER — APPOINTMENT (OUTPATIENT)
Dept: INFUSION THERAPY | Age: 74
End: 2019-03-13

## 2019-04-03 ENCOUNTER — APPOINTMENT (OUTPATIENT)
Dept: INFUSION THERAPY | Age: 74
End: 2019-04-03

## 2019-04-24 ENCOUNTER — APPOINTMENT (OUTPATIENT)
Dept: INFUSION THERAPY | Age: 74
End: 2019-04-24

## 2019-05-16 ENCOUNTER — HOSPITAL ENCOUNTER (OUTPATIENT)
Dept: ONCOLOGY | Age: 74
Discharge: HOME | End: 2019-05-16

## 2019-05-16 ENCOUNTER — OFFICE VISIT (OUTPATIENT)
Dept: ONCOLOGY | Age: 74
End: 2019-05-16

## 2019-05-16 VITALS
SYSTOLIC BLOOD PRESSURE: 108 MMHG | HEIGHT: 64 IN | OXYGEN SATURATION: 99 % | BODY MASS INDEX: 24.03 KG/M2 | RESPIRATION RATE: 18 BRPM | DIASTOLIC BLOOD PRESSURE: 61 MMHG | TEMPERATURE: 98.6 F | HEART RATE: 75 BPM

## 2019-05-16 DIAGNOSIS — D46.20 MDS (MYELODYSPLASTIC SYNDROME), LOW GRADE (HCC): ICD-10-CM

## 2019-05-16 DIAGNOSIS — D64.9 CHRONIC ANEMIA: ICD-10-CM

## 2019-05-16 DIAGNOSIS — E07.9 THYROID MASS: ICD-10-CM

## 2019-05-16 DIAGNOSIS — D46.20 MDS (MYELODYSPLASTIC SYNDROME), LOW GRADE (HCC): Primary | ICD-10-CM

## 2019-05-16 LAB
BASO+EOS+MONOS # BLD AUTO: 0.5 K/UL (ref 0–2.3)
BASO+EOS+MONOS NFR BLD AUTO: 7 % (ref 0.1–17)
DIFFERENTIAL METHOD BLD: ABNORMAL
ERYTHROCYTE [DISTWIDTH] IN BLOOD BY AUTOMATED COUNT: 12.7 % (ref 11.5–14.5)
HCT VFR BLD AUTO: 30.6 % (ref 36–48)
HGB BLD-MCNC: 10.3 G/DL (ref 12–16)
LYMPHOCYTES # BLD: 1.8 K/UL (ref 1.1–5.9)
LYMPHOCYTES NFR BLD: 27 % (ref 14–44)
MCH RBC QN AUTO: 30 PG (ref 25–35)
MCHC RBC AUTO-ENTMCNC: 33.7 G/DL (ref 31–37)
MCV RBC AUTO: 89.2 FL (ref 78–102)
NEUTS SEG # BLD: 4.6 K/UL (ref 1.8–9.5)
NEUTS SEG NFR BLD: 66 % (ref 40–70)
PLATELET # BLD AUTO: 172 K/UL (ref 140–440)
RBC # BLD AUTO: 3.43 M/UL (ref 4.1–5.1)
WBC # BLD AUTO: 6.9 K/UL (ref 4.5–13)

## 2019-05-16 NOTE — PROGRESS NOTES
Hematology/Oncology  Progress Note    Name: Zuly Haywood  Date: 2019  : 1945    PCP: Dr. Riddhi Howell    Ms. Eva Hammer is a 75 y.o.  female with refractory anemia/early MDS. She had a bone marrow biopsy on 13 that revealed no evidence of hematopoetic neoplasm or dysplasia. Current Therapy: CBC Q3 months, EDUARDO therapy will be warranted if h/h drops below 10 and 30 respectively. Subjective:     Ms. Eva Hammer is a 58-year-old woman with myelodysplastic syndrome and refractory anemia. She denies shortness of breath, chest pain, unexplained fevers or unexplained weight changes. Her appetite and energy level is reasonably good. She states she has changed her diet and eating healthier. She continues to take her iron once daily. She does not have any new complaints or concerns to report at this time.     Past Medical History:   Diagnosis Date    Allergic rhinitis     Anemia     Anemia 2018    Anemia NEC     Arthritis     Blurred vision     Cardiac defibrillator in place     Diabetes mellitus ([address]     Dyspepsia and other specified disorders of function of stomach     GERD (gastroesophageal reflux disease)     Heart attack ([address]     Heart disease     Hemorrhoid     Hyperlipemia     Hypertension     Ill-defined condition     Myeloblastic Syndrome    Joint pain     LBBB (left bundle branch block)     MDS (myelodysplastic syndrome) (HCC)     Myelodysplastic syndrome, low grade ([address] 2018    Pacemaker     Pacemaker     Sinus problem     SOB (shortness of breath)     Thyroid disorder     Vitamin D deficiency      Past Surgical History:   Procedure Laterality Date    HX CATARACT REMOVAL Bilateral     HX CHOLECYSTECTOMY      HX HYSTERECTOMY      HX PACEMAKER      [employer]    HX PACEMAKER PLACEMENT      HX THYROIDECTOMY       Social History     Socioeconomic History    Marital status:      Spouse name: Not on file    Number of children: Not on file    Years of education: Not on file    Highest education level: Not on file   Occupational History    Not on file   Social Needs    Financial resource strain: Not on file    Food insecurity:     Worry: Not on file     Inability: Not on file    Transportation needs:     Medical: Not on file     Non-medical: Not on file   Tobacco Use    Smoking status: Never Smoker    Smokeless tobacco: Never Used   Substance and Sexual Activity    Alcohol use: No    Drug use: No    Sexual activity: Not on file   Lifestyle    Physical activity:     Days per week: Not on file     Minutes per session: Not on file    Stress: Not on file   Relationships    Social connections:     Talks on phone: Not on file     Gets together: Not on file     Attends Christian service: Not on file     Active member of club or organization: Not on file     Attends meetings of clubs or organizations: Not on file     Relationship status: Not on file    Intimate partner violence:     Fear of current or ex partner: Not on file     Emotionally abused: Not on file     Physically abused: Not on file     Forced sexual activity: Not on file   Other Topics Concern    Not on file   Social History Narrative    Not on file     Family History   Problem Relation Age of Onset    Diabetes Mother     Diabetes Father     Cancer Father     Heart Disease Father     Hypertension Father     Diabetes Sister     Hypertension Sister     Stroke Sister     Diabetes Sister     Hypertension Sister      Current Outpatient Medications   Medication Sig Dispense Refill    methocarbamol (ROBAXIN) 750 mg tablet Take 1 Tab by mouth three (3) times daily. 15 Tab 0    nitroglycerin (NITROSTAT) 0.4 mg SL tablet 1 Tab by SubLINGual route DIALYSIS PRN for Chest Pain. 20 Tab 0    glimepiride (AMARYL) 1 mg tablet Take 1 mg by mouth Daily (before breakfast).  Loratadine (CHILDREN'S CLARITIN) 5 mg chew Take 1 Tab by mouth daily.       levocetirizine (XYZAL) 5 mg tablet Take  by mouth daily.  LACTOBACILLUS ACIDOPHILUS (PROBIOTIC PO) Take  by mouth daily.  ascorbic acid, vitamin C, (VITAMIN C) 500 mg tablet Take  by mouth daily.  metFORMIN (GLUCOPHAGE) 500 mg tablet 250 mg daily as needed.  levothyroxine (SYNTHROID) 50 mcg tablet Take  by mouth Daily (before breakfast).  ergocalciferol (VITAMIN D2) 50,000 unit capsule Take 50,000 Units by mouth every fourteen (14) days.  carvedilol (COREG) 25 mg tablet Take 25 mg by mouth two (2) times daily (with meals).  pantoprazole (PROTONIX) 40 mg tablet Take 40 mg by mouth daily.  aspirin 81 mg tablet Take 81 mg by mouth.  quinapril (ACCUPRIL) 40 mg tablet Take 40 mg by mouth daily.  simvastatin (ZOCOR) 40 mg tablet Take  by mouth nightly.  multivitamin (ONE A DAY) tablet Take 1 Tab by mouth daily. Review of Systems  Constitutional: The patient has no acute distress or discomfort. HEENT: The patient denies recent head trauma, eye pain, blurred vision,  hearing deficit, oropharyngeal mucosal pain or lesions, and the patient denies throat pain or discomfort. Lymphatics: The patient denies palpable peripheral lymphadenopathy. Hematologic: The patient denies having bruising, bleeding, or progressive fatigue. Respiratory: Patient denies having shortness of breath, cough, sputum production, fever, or dyspnea on exertion. Cardiovascular: The patient denies having leg pain, leg swelling, heart palpitations, chest permit, chest pain, or lightheadedness. The patient denies having dyspnea on exertion. Gastrointestinal: The patient denies having nausea, emesis, or diarrhea. The patient denies having any hematemesis or blood in the stool. Genitourinary: Patient denies having urinary urgency, frequency, or dysuria. The patient denies having blood in the urine.   Psychological: The patient denies having symptoms of nervousness, anxiety, depression, or thoughts of harming himself some of this.  Skin: Patient denies having skin rashes, skin, ulcerations, or unexplained itching or pruritus. Musculoskeletal: The patient denies having pain in the joints or bones. Objective:     Visit Vitals  /61   Pulse 75   Temp 98.6 °F (37 °C) (Oral)   Resp 18   Ht 5' 4\" (1.626 m)   SpO2 99%   BMI 24.03 kg/m²     ECOG 0 Pain score 0/10  Physical Exam:   Gen. Appearance: The patient is in no acute distress. Skin: no bruise or rashes  HEENT: The exam is unremarkable. Neck: Supple without lymphadenopathy or thyromegaly. Lungs: Clear to auscultation and percussion; there are no wheezes or rhonchi. Heart: Regular rate and rhythm; there are no murmurs, gallops, or rubs. Abdomen: Bowel sounds are present and normal.  There is no guarding, tenderness, or hepatosplenomegaly. Extremities: There is no clubbing, cyanosis, or edema. Neurologic: There are no focal neurologic deficits. Lymphatics: There is no palpable peripheral lymphadenopathy. Lab data:      Results for orders placed or performed during the hospital encounter of 05/16/19   CBC WITH 3 PART DIFF     Status: Abnormal   Result Value Ref Range Status    WBC 6.9 4.5 - 13.0 K/uL Final    RBC 3.43 (L) 4.10 - 5.10 M/uL Final    HGB 10.3 (L) 12.0 - 16.0 g/dL Final    HCT 30.6 (L) 36 - 48 % Final    MCV 89.2 78 - 102 FL Final    MCH 30.0 25.0 - 35.0 PG Final    MCHC 33.7 31 - 37 g/dL Final    RDW 12.7 11.5 - 14.5 % Final    PLATELET 975 688 - 849 K/uL Final    NEUTROPHILS 66 40 - 70 % Final    MIXED CELLS 7 0.1 - 17 % Final    LYMPHOCYTES 27 14 - 44 % Final    ABS. NEUTROPHILS 4.6 1.8 - 9.5 K/UL Final    ABS. MIXED CELLS 0.5 0.0 - 2.3 K/uL Final    ABS. LYMPHOCYTES 1.8 1.1 - 5.9 K/UL Final     Comment: Test performed at [address] [place] [address] or [facility]patient [facility] Location. Reviewed by Medical Director. DF AUTOMATED   Final           Assessment:     1. MDS (myelodysplastic syndrome), low grade ([facility]    2. Chronic anemia    3. Thyroid mass        Plan:     MDS/refractory anemia: Today her CBC shows her WBC is normal at 6.9K/uL, her hemoglobin is 10.3g/dL with hematocrit of 30.6%. Procrit is not required at this time. Chronic anemia: The current lappioqsyh77.3g/dL with hematocrit of 30.6%. Ferritin and Iron Profile along with CMP will be obtained at this time. Thyroid mass: The patient underwent partial thyroidectomy with removal of a benign lesion in her thyroid. She does have a long-standing history of hypothyroidism and is currently on hormone replacement therapy. Follow up in 3 months or sooner if indicated. Orders Placed This Encounter    COMPLETE CBC & AUTO DIFF WBC    InHouse CBC ([employer])     Standing Status:   Future     Number of Occurrences:   1     Standing Expiration Date:   5/23/2019    IRON PROFILE     Standing Status:   Future     Standing Expiration Date:   2/18/7009    METABOLIC PANEL, COMPREHENSIVE     Standing Status:   Future     Standing Expiration Date:   5/16/2020    FERRITIN     Standing Status:   Future     Standing Expiration Date:   5/16/2020       Tila JAMES [address] [place] [address] FOR [facility]  05/16/2019      I have assessed the patient independently and  agree with the full assessment as outlined.   Umair Haddad MD, [address]

## 2019-05-16 NOTE — PATIENT INSTRUCTIONS
Complete Blood Count (CBC): About This Test  What is it? A complete blood count (CBC) is a blood test that gives important information about your blood cells, especially red blood cells, white blood cells, and platelets. Why is this test done? A CBC may be done as part of a regular physical exam. There are many other reasons that a doctor may want this blood test, including to:  · Find the cause of symptoms such as fatigue, weakness, fever, bruising, or weight loss. · Find anemia or an infection. · See how much blood has been lost if there is bleeding. · Diagnose diseases of the blood, such as leukemia or polycythemia. How can you prepare for the test?  You do not need to do anything before having this test.  What happens during the test?  The health professional taking a sample of your blood will:  · Wrap an elastic band around your upper arm. This makes the veins below the band larger so it is easier to put a needle into the vein. · Clean the needle site with alcohol. · Put the needle into the vein. · Attach a tube to the needle to fill it with blood. · Remove the band from your arm when enough blood is collected. · Put a gauze pad or cotton ball over the needle site as the needle is removed. · Put pressure on the site and then put on a bandage. If this blood test is done on a baby, a heel stick may be done instead of a blood draw from a vein. What happens after the test?  · You will probably be able to go home right away. · You can go back to your usual activities right away. Follow-up care is a key part of your treatment and safety. Be sure to make and go to all appointments, and call your doctor if you are having problems. It's also a good idea to keep a list of the medicines you take. Ask your doctor when you can expect to have your test results. Where can you learn more? Go to http://bindu-apollo.info/.   Enter L548 in the search box to learn more about \"Complete Blood Count (CBC): About This Test.\"  Current as of: June 25, 2018  Content Version: 11.9  © 4508-0363 [employer]. Care instructions adapted under license by [employer] (which disclaims liability or warranty for this information). If you have questions about a medical condition or this instruction, always ask your healthcare professional. [address] any warranty or liability for your use of this information.

## 2019-05-17 LAB
ALBUMIN SERPL-MCNC: 4.2 G/DL (ref 3.5–4.8)
ALBUMIN/GLOB SERPL: 1.4 {RATIO} (ref 1.2–2.2)
ALP SERPL-CCNC: 73 IU/L (ref 39–117)
ALT SERPL-CCNC: 23 IU/L (ref 0–32)
AST SERPL-CCNC: 33 IU/L (ref 0–40)
BILIRUB SERPL-MCNC: 0.7 MG/DL (ref 0–1.2)
BUN SERPL-MCNC: 16 MG/DL (ref 8–27)
BUN/CREAT SERPL: 17 (ref 12–28)
CALCIUM SERPL-MCNC: 9.3 MG/DL (ref 8.7–10.3)
CHLORIDE SERPL-SCNC: 104 MMOL/L (ref 96–106)
CO2 SERPL-SCNC: 24 MMOL/L (ref 20–29)
CREAT SERPL-MCNC: 0.96 MG/DL (ref 0.57–1)
FERRITIN SERPL-MCNC: 140 NG/ML (ref 15–150)
GLOBULIN SER CALC-MCNC: 2.9 G/DL (ref 1.5–4.5)
GLUCOSE SERPL-MCNC: 142 MG/DL (ref 65–99)
IRON SATN MFR SERPL: 28 % (ref 15–55)
IRON SERPL-MCNC: 71 UG/DL (ref 27–139)
POTASSIUM SERPL-SCNC: 3.9 MMOL/L (ref 3.5–5.2)
PROT SERPL-MCNC: 7.1 G/DL (ref 6–8.5)
SODIUM SERPL-SCNC: 142 MMOL/L (ref 134–144)
TIBC SERPL-MCNC: 252 UG/DL (ref 250–450)
UIBC SERPL-MCNC: 181 UG/DL (ref 118–369)

## 2019-08-15 ENCOUNTER — OFFICE VISIT (OUTPATIENT)
Dept: ONCOLOGY | Age: 74
End: 2019-08-15

## 2019-08-15 VITALS
BODY MASS INDEX: 23.56 KG/M2 | HEIGHT: 64 IN | OXYGEN SATURATION: 98 % | HEART RATE: 76 BPM | WEIGHT: 138 LBS | TEMPERATURE: 98 F | SYSTOLIC BLOOD PRESSURE: 127 MMHG | DIASTOLIC BLOOD PRESSURE: 70 MMHG | RESPIRATION RATE: 18 BRPM

## 2019-08-15 DIAGNOSIS — D46.20 MDS (MYELODYSPLASTIC SYNDROME), LOW GRADE (HCC): Primary | ICD-10-CM

## 2019-08-15 DIAGNOSIS — D64.9 CHRONIC ANEMIA: ICD-10-CM

## 2019-08-15 DIAGNOSIS — E07.9 THYROID MASS: ICD-10-CM

## 2019-08-15 PROBLEM — Z95.0 PACEMAKER: Status: ACTIVE | Noted: 2017-08-25

## 2019-08-15 NOTE — PATIENT INSTRUCTIONS
Anemia: Care Instructions  Your Care Instructions    Anemia is a low level of red blood cells, which carry oxygen throughout your body. Many things can cause anemia. Lack of iron is one of the most common causes. Your body needs iron to make hemoglobin, a substance in red blood cells that carries oxygen from the lungs to your body's cells. Without enough iron, the body produces fewer and smaller red blood cells. As a result, your body's cells do not get enough oxygen, and you feel tired and weak. And you may have trouble concentrating. Bleeding is the most common cause of a lack of iron. You may have heavy menstrual bleeding or bleeding caused by conditions such as ulcers, hemorrhoids, or cancer. Regular use of aspirin or other anti-inflammatory medicines (such as ibuprofen) also can cause bleeding in some people. A lack of iron in your diet also can cause anemia, especially at times when the body needs more iron, such as during pregnancy, infancy, and the teen years. Your doctor may have prescribed iron pills. It may take several months of treatment for your iron levels to return to normal. Your doctor also may suggest that you eat foods that are rich in iron, such as meat and beans. There are many other causes of anemia. It is not always due to a lack of iron. Finding the specific cause of your anemia will help your doctor find the right treatment for you. Follow-up care is a key part of your treatment and safety. Be sure to make and go to all appointments, and call your doctor if you are having problems. It's also a good idea to know your test results and keep a list of the medicines you take. How can you care for yourself at home? · Take your medicines exactly as prescribed. Call your doctor if you think you are having a problem with your medicine. · If your doctor recommends iron pills, take them as directed:  ? Try to take the pills on an empty stomach about 1 hour before or 2 hours after meals. But you may need to take iron with food to avoid an upset stomach. ? Do not take antacids or drink milk or caffeine drinks (such as coffee, tea, or cola) at the same time or within 2 hours of the time that you take your iron. They can make it hard for your body to absorb the iron. ? Vitamin C (from food or supplements) helps your body absorb iron. Try taking iron pills with a glass of orange juice or some other food that is high in vitamin C, such as citrus fruits. ? Iron pills may cause stomach problems, such as heartburn, nausea, diarrhea, constipation, and cramps. Be sure to drink plenty of fluids, and include fruits, vegetables, and fiber in your diet each day. Iron pills often make your bowel movements dark or green. ? If you forget to take an iron pill, do not take a double dose of iron the next time you take a pill. ? Keep iron pills out of the reach of small children. An overdose of iron can be very dangerous. · Follow your doctor's advice about eating iron-rich foods. These include red meat, shellfish, poultry, eggs, beans, raisins, whole-grain bread, and leafy green vegetables. · Steam vegetables to help them keep their iron content. When should you call for help? Call 911 anytime you think you may need emergency care. For example, call if:    · You have symptoms of a heart attack. These may include:  ? Chest pain or pressure, or a strange feeling in the chest.  ? Sweating. ? Shortness of breath. ? Nausea or vomiting. ? Pain, pressure, or a strange feeling in the back, neck, jaw, or upper belly or in one or both shoulders or arms. ? Lightheadedness or sudden weakness. ? A fast or irregular heartbeat. After you call 911, the  may tell you to chew 1 adult-strength or 2 to 4 low-dose aspirin. Wait for an ambulance.  Do not try to drive yourself.     · You passed out (lost consciousness).    Call your doctor now or seek immediate medical care if:    · You have new or increased shortness of breath.     · You are dizzy or lightheaded, or you feel like you may faint.     · Your fatigue and weakness continue or get worse.     · You have any abnormal bleeding, such as:  ? Nosebleeds. ? Vaginal bleeding that is different (heavier, more frequent, at a different time of the month) than what you are used to.  ? Bloody or black stools, or rectal bleeding. ? Bloody or pink urine.    Watch closely for changes in your health, and be sure to contact your doctor if:    · You do not get better as expected. Where can you learn more? Go to http://bindu-apollo.info/. Enter R301 in the search box to learn more about \"Anemia: Care Instructions. \"  Current as of: March 28, 2019  Content Version: 12.1  © 9824-1867 [employer]. Care instructions adapted under license by [employer] (which disclaims liability or warranty for this information). If you have questions about a medical condition or this instruction, always ask your healthcare professional. [address] any warranty or liability for your use of this information.

## 2019-08-16 LAB
ALBUMIN SERPL-MCNC: 4.3 G/DL (ref 3.5–4.8)
ALBUMIN/GLOB SERPL: 1.5 {RATIO} (ref 1.2–2.2)
ALP SERPL-CCNC: 90 IU/L (ref 39–117)
ALT SERPL-CCNC: 16 IU/L (ref 0–32)
AST SERPL-CCNC: 26 IU/L (ref 0–40)
BASOPHILS # BLD AUTO: 0 X10E3/UL (ref 0–0.2)
BASOPHILS NFR BLD AUTO: 1 %
BILIRUB SERPL-MCNC: 0.7 MG/DL (ref 0–1.2)
BUN SERPL-MCNC: 19 MG/DL (ref 8–27)
BUN/CREAT SERPL: 19 (ref 12–28)
CALCIUM SERPL-MCNC: 9.3 MG/DL (ref 8.7–10.3)
CHLORIDE SERPL-SCNC: 102 MMOL/L (ref 96–106)
CO2 SERPL-SCNC: 24 MMOL/L (ref 20–29)
CREAT SERPL-MCNC: 1.01 MG/DL (ref 0.57–1)
EOSINOPHIL # BLD AUTO: 0.2 X10E3/UL (ref 0–0.4)
EOSINOPHIL NFR BLD AUTO: 3 %
ERYTHROCYTE [DISTWIDTH] IN BLOOD BY AUTOMATED COUNT: 12.6 % (ref 12.3–15.4)
FERRITIN SERPL-MCNC: 155 NG/ML (ref 15–150)
GLOBULIN SER CALC-MCNC: 2.9 G/DL (ref 1.5–4.5)
GLUCOSE SERPL-MCNC: 192 MG/DL (ref 65–99)
HCT VFR BLD AUTO: 30.5 % (ref 34–46.6)
HGB BLD-MCNC: 10 G/DL (ref 11.1–15.9)
IMM GRANULOCYTES # BLD AUTO: 0 X10E3/UL (ref 0–0.1)
IMM GRANULOCYTES NFR BLD AUTO: 0 %
IRON SATN MFR SERPL: 19 % (ref 15–55)
IRON SERPL-MCNC: 46 UG/DL (ref 27–139)
LYMPHOCYTES # BLD AUTO: 1.9 X10E3/UL (ref 0.7–3.1)
LYMPHOCYTES NFR BLD AUTO: 25 %
MCH RBC QN AUTO: 29.3 PG (ref 26.6–33)
MCHC RBC AUTO-ENTMCNC: 32.8 G/DL (ref 31.5–35.7)
MCV RBC AUTO: 89 FL (ref 79–97)
MONOCYTES # BLD AUTO: 0.5 X10E3/UL (ref 0.1–0.9)
MONOCYTES NFR BLD AUTO: 6 %
NEUTROPHILS # BLD AUTO: 4.8 X10E3/UL (ref 1.4–7)
NEUTROPHILS NFR BLD AUTO: 65 %
PLATELET # BLD AUTO: 187 X10E3/UL (ref 150–450)
POTASSIUM SERPL-SCNC: 3.8 MMOL/L (ref 3.5–5.2)
PROT SERPL-MCNC: 7.2 G/DL (ref 6–8.5)
RBC # BLD AUTO: 3.41 X10E6/UL (ref 3.77–5.28)
SODIUM SERPL-SCNC: 142 MMOL/L (ref 134–144)
TIBC SERPL-MCNC: 242 UG/DL (ref 250–450)
UIBC SERPL-MCNC: 196 UG/DL (ref 118–369)
WBC # BLD AUTO: 7.4 X10E3/UL (ref 3.4–10.8)

## 2019-11-14 ENCOUNTER — HOSPITAL ENCOUNTER (OUTPATIENT)
Dept: ONCOLOGY | Age: 74
Discharge: HOME | End: 2019-11-14

## 2019-11-14 ENCOUNTER — OFFICE VISIT (OUTPATIENT)
Dept: ONCOLOGY | Age: 74
End: 2019-11-14

## 2019-11-14 DIAGNOSIS — E07.9 THYROID MASS: ICD-10-CM

## 2019-11-14 DIAGNOSIS — D64.9 CHRONIC ANEMIA: Primary | ICD-10-CM

## 2019-11-14 DIAGNOSIS — D50.8 IRON DEFICIENCY ANEMIA SECONDARY TO INADEQUATE DIETARY IRON INTAKE: ICD-10-CM

## 2019-11-14 DIAGNOSIS — D64.9 CHRONIC ANEMIA: ICD-10-CM

## 2019-11-14 DIAGNOSIS — D46.20 MDS (MYELODYSPLASTIC SYNDROME), LOW GRADE (HCC): ICD-10-CM

## 2019-11-14 LAB
BASO+EOS+MONOS # BLD AUTO: 0.6 K/UL (ref 0–2.3)
BASO+EOS+MONOS NFR BLD AUTO: 9 % (ref 0.1–17)
DIFFERENTIAL METHOD BLD: ABNORMAL
ERYTHROCYTE [DISTWIDTH] IN BLOOD BY AUTOMATED COUNT: 12.4 % (ref 11.5–14.5)
HCT VFR BLD AUTO: 33.1 % (ref 36–48)
HGB BLD-MCNC: 10.9 G/DL (ref 12–16)
LYMPHOCYTES # BLD: 1.6 K/UL (ref 1.1–5.9)
LYMPHOCYTES NFR BLD: 24 % (ref 14–44)
MCH RBC QN AUTO: 29.9 PG (ref 25–35)
MCHC RBC AUTO-ENTMCNC: 32.9 G/DL (ref 31–37)
MCV RBC AUTO: 90.7 FL (ref 78–102)
NEUTS SEG # BLD: 4.4 K/UL (ref 1.8–9.5)
NEUTS SEG NFR BLD: 67 % (ref 40–70)
PLATELET # BLD AUTO: 188 K/UL (ref 140–440)
RBC # BLD AUTO: 3.65 M/UL (ref 4.1–5.1)
WBC # BLD AUTO: 6.6 K/UL (ref 4.5–13)

## 2019-11-14 NOTE — PATIENT INSTRUCTIONS
Anemia: Care Instructions  Your Care Instructions    Anemia is a low level of red blood cells, which carry oxygen throughout your body. Many things can cause anemia. Lack of iron is one of the most common causes. Your body needs iron to make hemoglobin, a substance in red blood cells that carries oxygen from the lungs to your body's cells. Without enough iron, the body produces fewer and smaller red blood cells. As a result, your body's cells do not get enough oxygen, and you feel tired and weak. And you may have trouble concentrating. Bleeding is the most common cause of a lack of iron. You may have heavy menstrual bleeding or bleeding caused by conditions such as ulcers, hemorrhoids, or cancer. Regular use of aspirin or other anti-inflammatory medicines (such as ibuprofen) also can cause bleeding in some people. A lack of iron in your diet also can cause anemia, especially at times when the body needs more iron, such as during pregnancy, infancy, and the teen years. Your doctor may have prescribed iron pills. It may take several months of treatment for your iron levels to return to normal. Your doctor also may suggest that you eat foods that are rich in iron, such as meat and beans. There are many other causes of anemia. It is not always due to a lack of iron. Finding the specific cause of your anemia will help your doctor find the right treatment for you. Follow-up care is a key part of your treatment and safety. Be sure to make and go to all appointments, and call your doctor if you are having problems. It's also a good idea to know your test results and keep a list of the medicines you take. How can you care for yourself at home? · Take your medicines exactly as prescribed. Call your doctor if you think you are having a problem with your medicine. · If your doctor recommends iron pills, take them as directed:  ? Try to take the pills on an empty stomach about 1 hour before or 2 hours after meals. But you may need to take iron with food to avoid an upset stomach. ? Do not take antacids or drink milk or caffeine drinks (such as coffee, tea, or cola) at the same time or within 2 hours of the time that you take your iron. They can make it hard for your body to absorb the iron. ? Vitamin C (from food or supplements) helps your body absorb iron. Try taking iron pills with a glass of orange juice or some other food that is high in vitamin C, such as citrus fruits. ? Iron pills may cause stomach problems, such as heartburn, nausea, diarrhea, constipation, and cramps. Be sure to drink plenty of fluids, and include fruits, vegetables, and fiber in your diet each day. Iron pills often make your bowel movements dark or green. ? If you forget to take an iron pill, do not take a double dose of iron the next time you take a pill. ? Keep iron pills out of the reach of small children. An overdose of iron can be very dangerous. · Follow your doctor's advice about eating iron-rich foods. These include red meat, shellfish, poultry, eggs, beans, raisins, whole-grain bread, and leafy green vegetables. · Steam vegetables to help them keep their iron content. When should you call for help? Call 911 anytime you think you may need emergency care. For example, call if:    · You have symptoms of a heart attack. These may include:  ? Chest pain or pressure, or a strange feeling in the chest.  ? Sweating. ? Shortness of breath. ? Nausea or vomiting. ? Pain, pressure, or a strange feeling in the back, neck, jaw, or upper belly or in one or both shoulders or arms. ? Lightheadedness or sudden weakness. ? A fast or irregular heartbeat. After you call 911, the  may tell you to chew 1 adult-strength or 2 to 4 low-dose aspirin. Wait for an ambulance.  Do not try to drive yourself.     · You passed out (lost consciousness).    Call your doctor now or seek immediate medical care if:    · You have new or increased shortness of breath.     · You are dizzy or lightheaded, or you feel like you may faint.     · Your fatigue and weakness continue or get worse.     · You have any abnormal bleeding, such as:  ? Nosebleeds. ? Vaginal bleeding that is different (heavier, more frequent, at a different time of the month) than what you are used to.  ? Bloody or black stools, or rectal bleeding. ? Bloody or pink urine.    Watch closely for changes in your health, and be sure to contact your doctor if:    · You do not get better as expected. Where can you learn more? Go to http://bindu-apollo.info/. Enter R301 in the search box to learn more about \"Anemia: Care Instructions. \"  Current as of: March 28, 2019  Content Version: 12.2  © 8645-7472 [employer]. Care instructions adapted under license by [employer] (which disclaims liability or warranty for this information). If you have questions about a medical condition or this instruction, always ask your healthcare professional. [place] [address] any warranty or liability for your use of this information. Anemia: Care Instructions  Your Care Instructions    Anemia is a low level of red blood cells, which carry oxygen throughout your body. Many things can cause anemia. Lack of iron is one of the most common causes. Your body needs iron to make hemoglobin, a substance in red blood cells that carries oxygen from the lungs to your body's cells. Without enough iron, the body produces fewer and smaller red blood cells. As a result, your body's cells do not get enough oxygen, and you feel tired and weak. And you may have trouble concentrating. Bleeding is the most common cause of a lack of iron. You may have heavy menstrual bleeding or bleeding caused by conditions such as ulcers, hemorrhoids, or cancer. Regular use of aspirin or other anti-inflammatory medicines (such as ibuprofen) also can cause bleeding in some people.  A lack of iron in your diet also can cause anemia, especially at times when the body needs more iron, such as during pregnancy, infancy, and the teen years. Your doctor may have prescribed iron pills. It may take several months of treatment for your iron levels to return to normal. Your doctor also may suggest that you eat foods that are rich in iron, such as meat and beans. There are many other causes of anemia. It is not always due to a lack of iron. Finding the specific cause of your anemia will help your doctor find the right treatment for you. Follow-up care is a key part of your treatment and safety. Be sure to make and go to all appointments, and call your doctor if you are having problems. It's also a good idea to know your test results and keep a list of the medicines you take. How can you care for yourself at home? · Take your medicines exactly as prescribed. Call your doctor if you think you are having a problem with your medicine. · If your doctor recommends iron pills, take them as directed:  ? Try to take the pills on an empty stomach about 1 hour before or 2 hours after meals. But you may need to take iron with food to avoid an upset stomach. ? Do not take antacids or drink milk or caffeine drinks (such as coffee, tea, or cola) at the same time or within 2 hours of the time that you take your iron. They can make it hard for your body to absorb the iron. ? Vitamin C (from food or supplements) helps your body absorb iron. Try taking iron pills with a glass of orange juice or some other food that is high in vitamin C, such as citrus fruits. ? Iron pills may cause stomach problems, such as heartburn, nausea, diarrhea, constipation, and cramps. Be sure to drink plenty of fluids, and include fruits, vegetables, and fiber in your diet each day. Iron pills often make your bowel movements dark or green.   ? If you forget to take an iron pill, do not take a double dose of iron the next time you take a pill.  ? Keep iron pills out of the reach of small children. An overdose of iron can be very dangerous. · Follow your doctor's advice about eating iron-rich foods. These include red meat, shellfish, poultry, eggs, beans, raisins, whole-grain bread, and leafy green vegetables. · Steam vegetables to help them keep their iron content. When should you call for help? Call 911 anytime you think you may need emergency care. For example, call if:    · You have symptoms of a heart attack. These may include:  ? Chest pain or pressure, or a strange feeling in the chest.  ? Sweating. ? Shortness of breath. ? Nausea or vomiting. ? Pain, pressure, or a strange feeling in the back, neck, jaw, or upper belly or in one or both shoulders or arms. ? Lightheadedness or sudden weakness. ? A fast or irregular heartbeat. After you call 911, the  may tell you to chew 1 adult-strength or 2 to 4 low-dose aspirin. Wait for an ambulance. Do not try to drive yourself.     · You passed out (lost consciousness).    Call your doctor now or seek immediate medical care if:    · You have new or increased shortness of breath.     · You are dizzy or lightheaded, or you feel like you may faint.     · Your fatigue and weakness continue or get worse.     · You have any abnormal bleeding, such as:  ? Nosebleeds. ? Vaginal bleeding that is different (heavier, more frequent, at a different time of the month) than what you are used to.  ? Bloody or black stools, or rectal bleeding. ? Bloody or pink urine.    Watch closely for changes in your health, and be sure to contact your doctor if:    · You do not get better as expected. Where can you learn more? Go to http://bindu-apollo.info/. Enter R301 in the search box to learn more about \"Anemia: Care Instructions. \"  Current as of: March 28, 2019  Content Version: 12.2  © 5645-6630 [employer].  Care instructions adapted under license by [employer] (which disclaims liability or warranty for this information). If you have questions about a medical condition or this instruction, always ask your healthcare professional. [address] any warranty or liability for your use of this information.

## 2019-11-14 NOTE — PROGRESS NOTES
Hematology/Oncology  Progress Note    Name: Seema Yadav  Date: 2019  : 1945    PCP: Dr. Jeanie German    Ms. Willy Marie is a 75 y.o.  female with refractory anemia/early MDS. She had a bone marrow biopsy on 13 that revealed no evidence of hematopoetic neoplasm or dysplasia. Current Therapy: CBC Q3 months, Retacrit will be warranted if Hgb/Hct drops below 10 and 30% respectively. Subjective:     Ms. Willy Marie is a 78-year-old woman with myelodysplastic syndrome and refractory anemia. She denies shortness of breath, chest pain, unexplained fevers or unexplained weight changes. Her appetite and energy level is reasonably good. She states she has changed her diet and eating healthier. She continues to take her iron once daily. She does not have any new complaints or concerns to report at this time.     Past Medical History:   Diagnosis Date    Allergic rhinitis     Anemia     Anemia 2018    Anemia NEC     Arthritis     Blurred vision     Cardiac defibrillator in place     Diabetes mellitus ([address]     Dyspepsia and other specified disorders of function of stomach     GERD (gastroesophageal reflux disease)     Heart attack ([address]     Heart disease     Hemorrhoid     Hyperlipemia     Hypertension     Ill-defined condition     Myeloblastic Syndrome    Joint pain     LBBB (left bundle branch block)     MDS (myelodysplastic syndrome) (HCC)     Myelodysplastic syndrome, low grade ([address] 2018    Pacemaker     Pacemaker     Sinus problem     SOB (shortness of breath)     Thyroid disorder     Vitamin D deficiency      Past Surgical History:   Procedure Laterality Date    HX CATARACT REMOVAL Bilateral     HX CHOLECYSTECTOMY      HX HYSTERECTOMY      HX PACEMAKER      [employer]    HX PACEMAKER PLACEMENT      HX THYROIDECTOMY       Social History     Socioeconomic History    Marital status:      Spouse name: Not on file    Number of children: Not on file    Years of education: Not on file    Highest education level: Not on file   Occupational History    Not on file   Social Needs    Financial resource strain: Not on file    Food insecurity:     Worry: Not on file     Inability: Not on file    Transportation needs:     Medical: Not on file     Non-medical: Not on file   Tobacco Use    Smoking status: Never Smoker    Smokeless tobacco: Never Used   Substance and Sexual Activity    Alcohol use: No    Drug use: No    Sexual activity: Not on file   Lifestyle    Physical activity:     Days per week: Not on file     Minutes per session: Not on file    Stress: Not on file   Relationships    Social connections:     Talks on phone: Not on file     Gets together: Not on file     Attends Baptism service: Not on file     Active member of club or organization: Not on file     Attends meetings of clubs or organizations: Not on file     Relationship status: Not on file    Intimate partner violence:     Fear of current or ex partner: Not on file     Emotionally abused: Not on file     Physically abused: Not on file     Forced sexual activity: Not on file   Other Topics Concern    Not on file   Social History Narrative    Not on file     Family History   Problem Relation Age of Onset    Diabetes Mother     Diabetes Father     Cancer Father     Heart Disease Father     Hypertension Father     Diabetes Sister     Hypertension Sister     Stroke Sister     Diabetes Sister     Hypertension Sister      Current Outpatient Medications   Medication Sig Dispense Refill    methocarbamol (ROBAXIN) 750 mg tablet Take 1 Tab by mouth three (3) times daily. 15 Tab 0    nitroglycerin (NITROSTAT) 0.4 mg SL tablet 1 Tab by SubLINGual route DIALYSIS PRN for Chest Pain. 20 Tab 0    glimepiride (AMARYL) 1 mg tablet Take 1 mg by mouth Daily (before breakfast).  Loratadine (CHILDREN'S CLARITIN) 5 mg chew Take 1 Tab by mouth daily.       levocetirizine (XYZAL) 5 mg tablet Take  by mouth daily.  LACTOBACILLUS ACIDOPHILUS (PROBIOTIC PO) Take  by mouth daily.  ascorbic acid, vitamin C, (VITAMIN C) 500 mg tablet Take  by mouth daily.  metFORMIN (GLUCOPHAGE) 500 mg tablet 250 mg daily as needed.  levothyroxine (SYNTHROID) 50 mcg tablet Take  by mouth Daily (before breakfast).  ergocalciferol (VITAMIN D2) 50,000 unit capsule Take 50,000 Units by mouth every fourteen (14) days.  carvedilol (COREG) 25 mg tablet Take 25 mg by mouth two (2) times daily (with meals).  pantoprazole (PROTONIX) 40 mg tablet Take 40 mg by mouth daily.  aspirin 81 mg tablet Take 81 mg by mouth.  quinapril (ACCUPRIL) 40 mg tablet Take 40 mg by mouth daily.  simvastatin (ZOCOR) 40 mg tablet Take  by mouth nightly.  multivitamin (ONE A DAY) tablet Take 1 Tab by mouth daily. Review of Systems  Constitutional: The patient has no acute distress or discomfort. HEENT: The patient denies recent head trauma, eye pain, blurred vision,  hearing deficit, oropharyngeal mucosal pain or lesions, and the patient denies throat pain or discomfort. Lymphatics: The patient denies palpable peripheral lymphadenopathy. Hematologic: The patient denies having bruising, bleeding, or progressive fatigue. Respiratory: Patient denies having shortness of breath, cough, sputum production, fever, or dyspnea on exertion. Cardiovascular: The patient denies having leg pain, leg swelling, heart palpitations, chest permit, chest pain, or lightheadedness. The patient denies having dyspnea on exertion. Gastrointestinal: The patient denies having nausea, emesis, or diarrhea. The patient denies having any hematemesis or blood in the stool. Genitourinary: Patient denies having urinary urgency, frequency, or dysuria. The patient denies having blood in the urine.   Psychological: The patient denies having symptoms of nervousness, anxiety, depression, or thoughts of harming himself some of this.  Skin: Patient denies having skin rashes, skin, ulcerations, or unexplained itching or pruritus. Musculoskeletal: The patient denies having pain in the joints or bones. Objective:     Visit Vitals  /68   Pulse 70   Temp 97.7 °F (36.5 °C)   Resp 18   Ht 5' (1.524 m)   Wt 62.6 kg (138 lb 0.1 oz)   BMI 26.95 kg/m²     ECOG 0 Pain score 0/10  Physical Exam:   Gen. Appearance: The patient is in no acute distress. Skin: no bruise or rashes  HEENT: The exam is unremarkable. Neck: Supple without lymphadenopathy or thyromegaly. Lungs: Clear to auscultation and percussion; there are no wheezes or rhonchi. Heart: Regular rate and rhythm; there are no murmurs, gallops, or rubs. Abdomen: Bowel sounds are present and normal.  There is no guarding, tenderness, or hepatosplenomegaly. Extremities: There is no clubbing, cyanosis, or edema. Neurologic: There are no focal neurologic deficits. Lymphatics: There is no palpable peripheral lymphadenopathy. Lab data:      Results for orders placed or performed during the hospital encounter of 11/14/19   CBC WITH 3 PART DIFF     Status: Abnormal   Result Value Ref Range Status    WBC 6.6 4.5 - 13.0 K/uL Final    RBC 3.65 (L) 4.10 - 5.10 M/uL Final    HGB 10.9 (L) 12.0 - 16.0 g/dL Final    HCT 33.1 (L) 36 - 48 % Final    MCV 90.7 78 - 102 FL Final    MCH 29.9 25.0 - 35.0 PG Final    MCHC 32.9 31 - 37 g/dL Final    RDW 12.4 11.5 - 14.5 % Final    PLATELET 018 495 - 685 K/uL Final    NEUTROPHILS 67 40 - 70 % Final    MIXED CELLS 9 0.1 - 17 % Final    LYMPHOCYTES 24 14 - 44 % Final    ABS. NEUTROPHILS 4.4 1.8 - 9.5 K/UL Final    ABS. MIXED CELLS 0.6 0.0 - 2.3 K/uL Final    ABS. LYMPHOCYTES 1.6 1.1 - 5.9 K/UL Final     Comment: Test performed at [address] [place] [address] or [facility] Location. Reviewed by Medical Director. DF AUTOMATED   Final           Assessment:     1. Chronic anemia    2.  Iron deficiency anemia secondary to inadequate dietary iron intake    3. MDS (myelodysplastic syndrome), low grade (HCC)    4. Thyroid mass        Plan:     MDS/refractory anemia: Today her CBC shows her WBC is normal at 6.6K/uL, her hemoglobin is 10.9 g/dL with hematocrit of 33.3 %. Procrit is not needed at this time. Chronic anemia: The current [id number] g/dL with hematocrit of 33.3 %. Ferritin and Iron Profile along with CMP will be obtained at this time. Thyroid mass: The patient underwent partial thyroidectomy with removal of a benign lesion in her thyroid. She does have a long-standing history of hypothyroidism and is currently on hormone replacement therapy. She sees her endocrinologist regularly. Follow up in 3 months or sooner if indicated. Elan MOLINA  11/14/2019    I have assessed the patient independently and  agree with the full assessment as outlined.   Zheng Brice MD, Stephanie Amezcua

## 2019-11-15 LAB
ALBUMIN SERPL-MCNC: 4.6 G/DL (ref 3.5–4.8)
ALBUMIN/GLOB SERPL: 1.6 {RATIO} (ref 1.2–2.2)
ALP SERPL-CCNC: 106 IU/L (ref 39–117)
ALT SERPL-CCNC: 15 IU/L (ref 0–32)
AST SERPL-CCNC: 19 IU/L (ref 0–40)
BILIRUB SERPL-MCNC: 0.6 MG/DL (ref 0–1.2)
BUN SERPL-MCNC: 20 MG/DL (ref 8–27)
BUN/CREAT SERPL: 19 (ref 12–28)
CALCIUM SERPL-MCNC: 9.5 MG/DL (ref 8.7–10.3)
CHLORIDE SERPL-SCNC: 102 MMOL/L (ref 96–106)
CO2 SERPL-SCNC: 22 MMOL/L (ref 20–29)
CREAT SERPL-MCNC: 1.03 MG/DL (ref 0.57–1)
FERRITIN SERPL-MCNC: 134 NG/ML (ref 15–150)
GLOBULIN SER CALC-MCNC: 2.9 G/DL (ref 1.5–4.5)
GLUCOSE SERPL-MCNC: 169 MG/DL (ref 65–99)
IRON SATN MFR SERPL: 23 % (ref 15–55)
IRON SERPL-MCNC: 58 UG/DL (ref 27–139)
POTASSIUM SERPL-SCNC: 4.2 MMOL/L (ref 3.5–5.2)
PROT SERPL-MCNC: 7.5 G/DL (ref 6–8.5)
SODIUM SERPL-SCNC: 141 MMOL/L (ref 134–144)
SPECIMEN STATUS REPORT, ROLRST: NORMAL
TIBC SERPL-MCNC: 257 UG/DL (ref 250–450)
UIBC SERPL-MCNC: 199 UG/DL (ref 118–369)

## 2019-11-17 VITALS
BODY MASS INDEX: 27.09 KG/M2 | DIASTOLIC BLOOD PRESSURE: 68 MMHG | TEMPERATURE: 97.7 F | WEIGHT: 138.01 LBS | HEIGHT: 60 IN | HEART RATE: 70 BPM | RESPIRATION RATE: 18 BRPM | SYSTOLIC BLOOD PRESSURE: 138 MMHG

## 2020-02-13 ENCOUNTER — OFFICE VISIT (OUTPATIENT)
Dept: ONCOLOGY | Age: 75
End: 2020-02-13

## 2020-02-13 VITALS
HEART RATE: 76 BPM | SYSTOLIC BLOOD PRESSURE: 114 MMHG | BODY MASS INDEX: 26.31 KG/M2 | WEIGHT: 134 LBS | HEIGHT: 60 IN | RESPIRATION RATE: 16 BRPM | DIASTOLIC BLOOD PRESSURE: 64 MMHG | TEMPERATURE: 98.4 F | OXYGEN SATURATION: 98 %

## 2020-02-13 DIAGNOSIS — D64.9 CHRONIC ANEMIA: ICD-10-CM

## 2020-02-13 DIAGNOSIS — D46.20 MDS (MYELODYSPLASTIC SYNDROME), LOW GRADE (HCC): Primary | ICD-10-CM

## 2020-02-13 DIAGNOSIS — E07.9 THYROID MASS: ICD-10-CM

## 2020-02-13 NOTE — PATIENT INSTRUCTIONS
Complete Blood Count (CBC): About This Test  What is it? A complete blood count (CBC) is a blood test that gives important information about your blood cells, especially red blood cells, white blood cells, and platelets. Why is this test done? A CBC may be done as part of a regular physical exam. There are many other reasons that a doctor may want this blood test, including to:  · Find the cause of symptoms such as fatigue, weakness, fever, bruising, or weight loss. · Find anemia or an infection. · See how much blood has been lost if there is bleeding. · Diagnose diseases of the blood, such as leukemia or polycythemia. How can you prepare for the test?  You do not need to do anything before having this test.  What happens during the test?  The health professional taking a sample of your blood will:  · Wrap an elastic band around your upper arm. This makes the veins below the band larger so it is easier to put a needle into the vein. · Clean the needle site with alcohol. · Put the needle into the vein. · Attach a tube to the needle to fill it with blood. · Remove the band from your arm when enough blood is collected. · Put a gauze pad or cotton ball over the needle site as the needle is removed. · Put pressure on the site and then put on a bandage. If this blood test is done on a baby, a heel stick may be done instead of a blood draw from a vein. What happens after the test?  · You will probably be able to go home right away. · You can go back to your usual activities right away. Follow-up care is a key part of your treatment and safety. Be sure to make and go to all appointments, and call your doctor if you are having problems. It's also a good idea to keep a list of the medicines you take. Ask your doctor when you can expect to have your test results. Where can you learn more? Go to http://bindu-apollo.info/.   Enter G937 in the search box to learn more about \"Complete Blood Count (CBC): About This Test.\"  Current as of: March 28, 2019  Content Version: 12.2  © 9040-6927 [employer]. Care instructions adapted under license by [employer] (which disclaims liability or warranty for this information). If you have questions about a medical condition or this instruction, always ask your healthcare professional. [address] any warranty or liability for your use of this information.

## 2020-02-13 NOTE — PROGRESS NOTES
Hematology/Oncology  Progress Note    Name: Shayla Garay  Date: 2020  : 1945    PCP: Dr. Les Medel    Ms. Rhonda Daniels is a 75 y.o.  female with refractory anemia/early MDS. She had a bone marrow biopsy on 13 that revealed no evidence of hematopoetic neoplasm or dysplasia. Current Therapy: CBC every 3 months, Retacrit will be warranted if Hgb/Hct drops below 10 and 30% respectively. Subjective:     Ms. Rhonda Daniels is a 42-year-old woman with myelodysplastic syndrome and refractory anemia. She was diagnosed in 2013. She reports today that she had an appointment with her Cardiologist last Tuesday (2020) for her pacemaker battery replacement. She states she has been doing well since her last visit. She also reports that her Synthroid dose was increased to 75mcg 3x a week from 50mcg. She denies shortness of breath, chest pain, unexplained fevers or unexplained weight changes. She states her appetite and energy level is reasonably good. She states she has changed her diet and eating healthier. She continues to take her iron once daily. She does not have any new complaints or concerns to report at this time. Past medical history, family history, and social history: these were reviewed and remain unchanged.     Past Medical History:   Diagnosis Date    Allergic rhinitis     Anemia     Anemia 2018    Anemia NEC     Arthritis     Blurred vision     Cardiac defibrillator in place     Diabetes mellitus ([address]     Dyspepsia and other specified disorders of function of stomach     GERD (gastroesophageal reflux disease)     Heart attack ([address]     Heart disease     Hemorrhoid     Hyperlipemia     Hypertension     Ill-defined condition     Myeloblastic Syndrome    Joint pain     LBBB (left bundle branch block)     MDS (myelodysplastic syndrome) (HCC)     Myelodysplastic syndrome, low grade ([address] 2018    Pacemaker     Pacemaker     Sinus problem     SOB (shortness of breath)  Thyroid disorder     Vitamin D deficiency      Past Surgical History:   Procedure Laterality Date    HX CATARACT REMOVAL Bilateral     HX CHOLECYSTECTOMY      HX HYSTERECTOMY      HX PACEMAKER      [employer]    HX PACEMAKER PLACEMENT      HX THYROIDECTOMY       Social History     Socioeconomic History    Marital status:      Spouse name: Not on file    Number of children: Not on file    Years of education: Not on file    Highest education level: Not on file   Occupational History    Not on file   Social Needs    Financial resource strain: Not on file    Food insecurity:     Worry: Not on file     Inability: Not on file    Transportation needs:     Medical: Not on file     Non-medical: Not on file   Tobacco Use    Smoking status: Never Smoker    Smokeless tobacco: Never Used   Substance and Sexual Activity    Alcohol use: No    Drug use: No    Sexual activity: Not on file   Lifestyle    Physical activity:     Days per week: Not on file     Minutes per session: Not on file    Stress: Not on file   Relationships    Social connections:     Talks on phone: Not on file     Gets together: Not on file     Attends Voodoo service: Not on file     Active member of club or organization: Not on file     Attends meetings of clubs or organizations: Not on file     Relationship status: Not on file    Intimate partner violence:     Fear of current or ex partner: Not on file     Emotionally abused: Not on file     Physically abused: Not on file     Forced sexual activity: Not on file   Other Topics Concern    Not on file   Social History Narrative    Not on file     Family History   Problem Relation Age of Onset    Diabetes Mother     Diabetes Father     Cancer Father     Heart Disease Father     Hypertension Father     Diabetes Sister     Hypertension Sister     Stroke Sister     Diabetes Sister     Hypertension Sister      Current Outpatient Medications   Medication Sig Dispense Refill    methocarbamol (ROBAXIN) 750 mg tablet Take 1 Tab by mouth three (3) times daily. 15 Tab 0    nitroglycerin (NITROSTAT) 0.4 mg SL tablet 1 Tab by SubLINGual route DIALYSIS PRN for Chest Pain. 20 Tab 0    glimepiride (AMARYL) 1 mg tablet Take 1 mg by mouth Daily (before breakfast).  Loratadine (CHILDREN'S CLARITIN) 5 mg chew Take 1 Tab by mouth daily.  levocetirizine (XYZAL) 5 mg tablet Take  by mouth daily.  LACTOBACILLUS ACIDOPHILUS (PROBIOTIC PO) Take  by mouth daily.  ascorbic acid, vitamin C, (VITAMIN C) 500 mg tablet Take  by mouth daily.  metFORMIN (GLUCOPHAGE) 500 mg tablet 250 mg daily as needed.  levothyroxine (SYNTHROID) 50 mcg tablet Take  by mouth Daily (before breakfast).  ergocalciferol (VITAMIN D2) 50,000 unit capsule Take 50,000 Units by mouth every fourteen (14) days.  carvedilol (COREG) 25 mg tablet Take 25 mg by mouth two (2) times daily (with meals).  pantoprazole (PROTONIX) 40 mg tablet Take 40 mg by mouth daily.  aspirin 81 mg tablet Take 81 mg by mouth.  quinapril (ACCUPRIL) 40 mg tablet Take 40 mg by mouth daily.  simvastatin (ZOCOR) 40 mg tablet Take  by mouth nightly.  multivitamin (ONE A DAY) tablet Take 1 Tab by mouth daily. Review of Systems  Constitutional: The patient has no acute distress or discomfort. HEENT: The patient denies recent head trauma, eye pain, blurred vision,  hearing deficit, oropharyngeal mucosal pain or lesions, and the patient denies throat pain or discomfort. Lymphatics: The patient denies palpable peripheral lymphadenopathy. Hematologic: The patient denies having bruising, bleeding, or progressive fatigue. Respiratory: Patient denies having shortness of breath, cough, sputum production, fever, or dyspnea on exertion. Cardiovascular: The patient denies having leg pain, leg swelling, heart palpitations, chest permit, chest pain, or lightheadedness.   The patient denies having dyspnea on exertion. Gastrointestinal: The patient denies having nausea, emesis, or diarrhea. The patient denies having any hematemesis or blood in the stool. Genitourinary: Patient denies having urinary urgency, frequency, or dysuria. The patient denies having blood in the urine. Psychological: The patient denies having symptoms of nervousness, anxiety, depression, or thoughts of harming himself some of this. Skin: Patient denies having skin rashes, skin, ulcerations, or unexplained itching or pruritus. Musculoskeletal: The patient denies having pain in the joints or bones. Objective:     Visit Vitals  /64   Pulse 76   Temp 98.4 °F (36.9 °C) (Oral)   Resp 16   Ht 5' (1.524 m)   Wt 60.8 kg (134 lb)   SpO2 98%   BMI 26.17 kg/m²     ECOG 0 Pain score 0/10  Physical Exam:   Gen. Appearance: The patient is in no acute distress. Skin: no bruise or rashes  HEENT: The exam is unremarkable. Neck: Supple without lymphadenopathy or thyromegaly. Lungs: Clear to auscultation and percussion; there are no wheezes or rhonchi. Heart: Regular rate and rhythm; there are no murmurs, gallops, or rubs. Abdomen: Bowel sounds are present and normal.  There is no guarding, tenderness, or hepatosplenomegaly. Extremities: There is no clubbing, cyanosis, or edema. Neurologic: There are no focal neurologic deficits. Lymphatics: There is no palpable peripheral lymphadenopathy.     Lab data:      Results for orders placed or performed during the hospital encounter of 11/14/19   CBC WITH 3 PART DIFF     Status: Abnormal   Result Value Ref Range Status    WBC 6.6 4.5 - 13.0 K/uL Final    RBC 3.65 (L) 4.10 - 5.10 M/uL Final    HGB 10.9 (L) 12.0 - 16.0 g/dL Final    HCT 33.1 (L) 36 - 48 % Final    MCV 90.7 78 - 102 FL Final    MCH 29.9 25.0 - 35.0 PG Final    MCHC 32.9 31 - 37 g/dL Final    RDW 12.4 11.5 - 14.5 % Final    PLATELET 507 642 - 699 K/uL Final    NEUTROPHILS 67 40 - 70 % Final    MIXED CELLS 9 0.1 - 17 % Final    LYMPHOCYTES 24 14 - 44 % Final    ABS. NEUTROPHILS 4.4 1.8 - 9.5 K/UL Final    ABS. MIXED CELLS 0.6 0.0 - 2.3 K/uL Final    ABS. LYMPHOCYTES 1.6 1.1 - 5.9 K/UL Final     Comment: Test performed at [address] [place] [address] or [facility]patient [facility] Location. Reviewed by Medical Director. DF AUTOMATED   Final           Assessment:     1. MDS (myelodysplastic syndrome), low grade ([address]    2. Chronic anemia    3. Thyroid mass      Plan:     MDS/refractory anemia: Today her CBC is pending. Her most recent CBC  showed her WBC is normal at 6.6K/uL, her hemoglobin is 10.9g/dL with hematocrit of 33.1%. Procrit is not needed at this time. Chronic anemia: The current hemoglobin is 10.9 g/dL with hematocrit of 33.1%. Ferritin and Iron Profile along with CMP will be obtained at this time. Thyroid mass: The patient underwent partial thyroidectomy with removal of a benign lesion in her thyroid. She does have a long-standing history of hypothyroidism and is currently on hormone replacement therapy. She sees her endocrinologist regularly. Follow up in 3 months or sooner if indicated. JESSE Marie  02/13/2020      I have assessed the patient independently and  agree with the full assessment as outlined.   Maggie Jacobo MD, [facility]

## 2020-02-14 LAB
A-G RATIO,AGRAT: 1.5 RATIO (ref 1.1–2.6)
ABSOLUTE LYMPHOCYTE COUNT, 10803: 1.5 K/UL (ref 1–4.8)
ALBUMIN SERPL-MCNC: 4.1 G/DL (ref 3.5–5)
ALP SERPL-CCNC: 60 U/L (ref 40–120)
ALT SERPL-CCNC: 11 U/L (ref 5–40)
ANION GAP SERPL CALC-SCNC: 13 MMOL/L
AST SERPL W P-5'-P-CCNC: 17 U/L (ref 10–37)
BASOPHILS # BLD: 0 K/UL (ref 0–0.2)
BASOPHILS NFR BLD: 0 % (ref 0–2)
BILIRUB SERPL-MCNC: 0.9 MG/DL (ref 0.2–1.2)
BUN SERPL-MCNC: 15 MG/DL (ref 6–22)
CALCIUM SERPL-MCNC: 9.2 MG/DL (ref 8.4–10.5)
CHLORIDE SERPL-SCNC: 101 MMOL/L (ref 98–110)
CO2 SERPL-SCNC: 26 MMOL/L (ref 20–32)
CREAT SERPL-MCNC: 1 MG/DL (ref 0.8–1.4)
EOSINOPHIL # BLD: 0.1 K/UL (ref 0–0.5)
EOSINOPHIL NFR BLD: 2 % (ref 0–6)
ERYTHROCYTE [DISTWIDTH] IN BLOOD BY AUTOMATED COUNT: 13 % (ref 10–15.5)
FE % SATURATION,PSAT: 14 % (ref 20–50)
FERRITIN SERPL-MCNC: 183 NG/ML (ref 10–291)
GFRAA, 66117: >60
GFRNA, 66118: 51.7
GLOBULIN,GLOB: 2.7 G/DL (ref 2–4)
GLUCOSE SERPL-MCNC: 233 MG/DL (ref 70–99)
GRANULOCYTES,GRANS: 72 % (ref 40–75)
HCT VFR BLD AUTO: 32.1 % (ref 35.1–48.3)
HGB BLD-MCNC: 10.3 G/DL (ref 11.7–16.1)
IRON,IRN: 32 MCG/DL (ref 30–160)
LYMPHOCYTES, LYMLT: 18 % (ref 20–45)
MCH RBC QN AUTO: 30 PG (ref 26–34)
MCHC RBC AUTO-ENTMCNC: 32 G/DL (ref 31–36)
MCV RBC AUTO: 92 FL (ref 81–99)
MONOCYTES # BLD: 0.8 K/UL (ref 0.1–1)
MONOCYTES NFR BLD: 9 % (ref 3–12)
NEUTROPHILS # BLD AUTO: 6.3 K/UL (ref 1.8–7.7)
PLATELET # BLD AUTO: 180 K/UL (ref 140–440)
PMV BLD AUTO: 11.4 FL (ref 9–13)
POTASSIUM SERPL-SCNC: 4 MMOL/L (ref 3.5–5.5)
PROT SERPL-MCNC: 6.8 G/DL (ref 6.2–8.1)
RBC # BLD AUTO: 3.49 M/UL (ref 3.8–5.2)
SODIUM SERPL-SCNC: 140 MMOL/L (ref 133–145)
TIBC,TIBC: 223 MCG/DL (ref 228–428)
UIBC SERPL-MCNC: 191 MCG/DL (ref 110–370)
WBC # BLD AUTO: 8.8 K/UL (ref 4–11)

## 2020-05-06 ENCOUNTER — NURSE NAVIGATOR (OUTPATIENT)
Dept: OTHER | Age: 75
End: 2020-05-06

## 2020-05-06 ENCOUNTER — OFFICE VISIT (OUTPATIENT)
Dept: ONCOLOGY | Age: 75
End: 2020-05-06

## 2020-05-06 ENCOUNTER — HOSPITAL ENCOUNTER (OUTPATIENT)
Dept: INFUSION THERAPY | Age: 75
Discharge: HOME | End: 2020-05-06
Payer: MEDICARE

## 2020-05-06 VITALS
RESPIRATION RATE: 16 BRPM | DIASTOLIC BLOOD PRESSURE: 74 MMHG | SYSTOLIC BLOOD PRESSURE: 144 MMHG | TEMPERATURE: 97.9 F | HEART RATE: 69 BPM | OXYGEN SATURATION: 100 %

## 2020-05-06 VITALS
HEIGHT: 60 IN | RESPIRATION RATE: 16 BRPM | BODY MASS INDEX: 27.48 KG/M2 | SYSTOLIC BLOOD PRESSURE: 144 MMHG | OXYGEN SATURATION: 100 % | DIASTOLIC BLOOD PRESSURE: 74 MMHG | TEMPERATURE: 97.9 F | HEART RATE: 69 BPM | WEIGHT: 140 LBS

## 2020-05-06 DIAGNOSIS — E04.1 THYROID NODULE: ICD-10-CM

## 2020-05-06 DIAGNOSIS — D46.9 MDS (MYELODYSPLASTIC SYNDROME) (HCC): Primary | ICD-10-CM

## 2020-05-06 DIAGNOSIS — D64.9 ANEMIA, UNSPECIFIED TYPE: ICD-10-CM

## 2020-05-06 DIAGNOSIS — D46.9 MDS (MYELODYSPLASTIC SYNDROME) (HCC): ICD-10-CM

## 2020-05-06 DIAGNOSIS — R10.13 EPIGASTRIC PAIN: ICD-10-CM

## 2020-05-06 LAB
ALBUMIN SERPL-MCNC: 4.1 G/DL (ref 3.4–5)
ALBUMIN/GLOB SERPL: 1 {RATIO} (ref 0.8–1.7)
ALP SERPL-CCNC: 93 U/L (ref 45–117)
ALT SERPL-CCNC: 21 U/L (ref 13–56)
ANION GAP SERPL CALC-SCNC: 3 MMOL/L (ref 3–18)
AST SERPL-CCNC: 19 U/L (ref 10–38)
BASOPHILS # BLD: 0 K/UL (ref 0–0.1)
BASOPHILS NFR BLD: 1 % (ref 0–2)
BILIRUB SERPL-MCNC: 0.6 MG/DL (ref 0.2–1)
BUN SERPL-MCNC: 15 MG/DL (ref 7–18)
BUN/CREAT SERPL: 15 (ref 12–20)
CALCIUM SERPL-MCNC: 9.4 MG/DL (ref 8.5–10.1)
CHLORIDE SERPL-SCNC: 109 MMOL/L (ref 100–111)
CO2 SERPL-SCNC: 29 MMOL/L (ref 21–32)
CREAT SERPL-MCNC: 1.03 MG/DL (ref 0.6–1.3)
DIFFERENTIAL METHOD BLD: ABNORMAL
EOSINOPHIL # BLD: 0.2 K/UL (ref 0–0.4)
EOSINOPHIL NFR BLD: 3 % (ref 0–5)
ERYTHROCYTE [DISTWIDTH] IN BLOOD BY AUTOMATED COUNT: 12.9 % (ref 11.6–14.5)
FERRITIN SERPL-MCNC: 130 NG/ML (ref 8–388)
FOLATE SERPL-MCNC: >20 NG/ML (ref 3.1–17.5)
GLOBULIN SER CALC-MCNC: 4.1 G/DL (ref 2–4)
GLUCOSE SERPL-MCNC: 137 MG/DL (ref 74–99)
HCT VFR BLD AUTO: 35.8 % (ref 35–45)
HGB BLD-MCNC: 12 G/DL (ref 12–16)
IRON SATN MFR SERPL: 18 % (ref 20–50)
IRON SERPL-MCNC: 51 UG/DL (ref 50–175)
LYMPHOCYTES # BLD: 1.5 K/UL (ref 0.9–3.6)
LYMPHOCYTES NFR BLD: 27 % (ref 21–52)
MCH RBC QN AUTO: 29.6 PG (ref 24–34)
MCHC RBC AUTO-ENTMCNC: 33.5 G/DL (ref 31–37)
MCV RBC AUTO: 88.2 FL (ref 74–97)
MONOCYTES # BLD: 0.3 K/UL (ref 0.05–1.2)
MONOCYTES NFR BLD: 5 % (ref 3–10)
NEUTS SEG # BLD: 3.6 K/UL (ref 1.8–8)
NEUTS SEG NFR BLD: 64 % (ref 40–73)
PLATELET # BLD AUTO: 205 K/UL (ref 135–420)
PMV BLD AUTO: 10 FL (ref 9.2–11.8)
POTASSIUM SERPL-SCNC: 4.5 MMOL/L (ref 3.5–5.5)
PROT SERPL-MCNC: 8.2 G/DL (ref 6.4–8.2)
RBC # BLD AUTO: 4.06 M/UL (ref 4.2–5.3)
SODIUM SERPL-SCNC: 141 MMOL/L (ref 136–145)
TIBC SERPL-MCNC: 282 UG/DL (ref 250–450)
VIT B12 SERPL-MCNC: >2000 PG/ML (ref 211–911)
WBC # BLD AUTO: 5.5 K/UL (ref 4.6–13.2)

## 2020-05-06 PROCEDURE — 82607 VITAMIN B-12: CPT

## 2020-05-06 PROCEDURE — 82728 ASSAY OF FERRITIN: CPT

## 2020-05-06 PROCEDURE — 85025 COMPLETE CBC W/AUTO DIFF WBC: CPT

## 2020-05-06 PROCEDURE — 83540 ASSAY OF IRON: CPT

## 2020-05-06 PROCEDURE — 80053 COMPREHEN METABOLIC PANEL: CPT

## 2020-05-06 PROCEDURE — 36415 COLL VENOUS BLD VENIPUNCTURE: CPT

## 2020-05-06 RX ORDER — BUDESONIDE 0.5 MG/2ML
INHALANT ORAL
COMMUNITY
Start: 2020-04-28

## 2020-05-06 RX ORDER — AMLODIPINE BESYLATE 2.5 MG/1
2.5 TABLET ORAL DAILY
COMMUNITY
Start: 2020-04-06

## 2020-05-06 RX ORDER — HYDROCODONE BITARTRATE AND ACETAMINOPHEN 5; 325 MG/1; MG/1
TABLET ORAL
COMMUNITY
Start: 2020-02-11 | End: 2020-08-14

## 2020-05-06 RX ORDER — LANCETS 28 GAUGE
EACH MISCELLANEOUS
COMMUNITY
Start: 2020-03-26
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
## 2020-05-06 NOTE — PROGRESS NOTES
[facility] Progress Note    Date: May 6, 2020    Name: Mike Lindsay    MRN: [id number]         : 1945    Peripheral Lab Draw      Ms. Akilah Kim to [place], ambulatory at 1110 accompanied by self. Pt was assessed and education was provided. Ms. Fidencio Pinto vitals were reviewed and patient was observed for 5 minutes prior to treatment. Visit Vitals  /74 (BP 1 Location: Left arm, BP Patient Position: Sitting)   Pulse 69   Temp 97.9 °F (36.6 °C) (Oral)   Resp 16   SpO2 100%     Recent Results (from the past 12 hour(s))   METABOLIC PANEL, COMPREHENSIVE    Collection Time: 20 11:18 AM   Result Value Ref Range    Sodium 141 136 - 145 mmol/L    Potassium 4.5 3.5 - 5.5 mmol/L    Chloride 109 100 - 111 mmol/L    CO2 29 21 - 32 mmol/L    Anion gap 3 3.0 - 18 mmol/L    Glucose 137 (H) 74 - 99 mg/dL    BUN 15 7.0 - 18 MG/DL    Creatinine 1.03 0.6 - 1.3 MG/DL    BUN/Creatinine ratio 15 12 - 20      GFR est AA >60 >60 ml/min/1.73m2    GFR est non-AA 52 (L) >60 ml/min/1.73m2    Calcium 9.4 8.5 - 10.1 MG/DL    Bilirubin, total 0.6 0.2 - 1.0 MG/DL    ALT (SGPT) 21 13 - 56 U/L    AST (SGOT) 19 10 - 38 U/L    Alk. phosphatase 93 45 - 117 U/L    Protein, total 8.2 6.4 - 8.2 g/dL    Albumin 4.1 3.4 - 5.0 g/dL    Globulin 4.1 (H) 2.0 - 4.0 g/dL    A-G Ratio 1.0 0.8 - 1.7     CBC WITH AUTOMATED DIFF    Collection Time: 20 11:18 AM   Result Value Ref Range    WBC 5.5 4.6 - 13.2 K/uL    RBC 4.06 (L) 4.20 - 5.30 M/uL    HGB 12.0 12.0 - 16.0 g/dL    HCT 35.8 35.0 - 45.0 %    MCV 88.2 74.0 - 97.0 FL    MCH 29.6 24.0 - 34.0 PG    MCHC 33.5 31.0 - 37.0 g/dL    RDW 12.9 11.6 - 14.5 %    PLATELET 370 205 - 469 K/uL    MPV 10.0 9.2 - 11.8 FL    NEUTROPHILS 64 40 - 73 %    LYMPHOCYTES 27 21 - 52 %    MONOCYTES 5 3 - 10 %    EOSINOPHILS 3 0 - 5 %    BASOPHILS 1 0 - 2 %    ABS. NEUTROPHILS 3.6 1.8 - 8.0 K/UL    ABS. LYMPHOCYTES 1.5 0.9 - 3.6 K/UL    ABS. MONOCYTES 0.3 0.05 - 1.2 K/UL    ABS. EOSINOPHILS 0.2 0.0 - 0.4 K/UL    ABS. BASOPHILS 0.0 0.0 - 0.1 K/UL    DF AUTOMATED         Blood obtained peripherally from left arm x 1 attempt with butterfly needle and sent to lab for Cbc w/diff, Cmp, Iron Profile, itamin B12 & Folate per written orders. No bleeding or hematoma noted at site. Gauze and coban applied. Ms. Leigh Goel tolerated the phlebotomy, and had no complaints. Patient armband removed and shredded. Ms. Leigh Goel was discharged from [place] [address] in stable condition at 1120.      Josepha Leyden Phlebotomist PCT  May 6, 2020  1:14 PM

## 2020-05-11 ENCOUNTER — TELEPHONE (OUTPATIENT)
Dept: ONCOLOGY | Age: 75
End: 2020-05-11

## 2020-05-11 NOTE — TELEPHONE ENCOUNTER
Patient requesting call back regarding recent labs, stated she noticed some were abnormal and was concerned.  Please f/u

## 2020-05-12 NOTE — NURSE NAVIGATOR
Saw pt in clinic with Dr. Leeroy Light transferred from Dr Jorge Green c/o left side epigastric  Pain. CT abdomen and pelvis ordered, f/u with endocrinologist for thyroid mass. Labs today, if HB less the 10 give Procrit. RTC I 2 weeks , all questions answered.

## 2020-05-15 ENCOUNTER — DOCUMENTATION ONLY (OUTPATIENT)
Dept: ONCOLOGY | Age: 75
End: 2020-05-15

## 2020-05-20 ENCOUNTER — VIRTUAL VISIT (OUTPATIENT)
Dept: ONCOLOGY | Age: 75
End: 2020-05-20

## 2020-05-20 DIAGNOSIS — E07.9 THYROID MASS: ICD-10-CM

## 2020-05-20 DIAGNOSIS — D46.9 MDS (MYELODYSPLASTIC SYNDROME) (HCC): Primary | ICD-10-CM

## 2020-05-20 DIAGNOSIS — D64.9 ANEMIA, UNSPECIFIED TYPE: ICD-10-CM

## 2020-05-20 DIAGNOSIS — R10.13 EPIGASTRIC PAIN: ICD-10-CM

## 2020-05-20 NOTE — PROGRESS NOTES
Sim Sarmiento is a 75 y.o. female presenting today for a follow-up appointment. Patient is at home; and verified by 2 patient identifiers with verbal permission to start virtual visit. Patient accompanied by self, denies any generalized pain. Patient has no other complaints at this time. Chief Complaint   Patient presents with    Follow-up     MDS       Patient took her temperature orally and reading was 97.1 during this virtual visit; patient is at home due to COVID-19 protocal    Depression Screening:  3 most recent PHQ Screens 5/20/2020   Little interest or pleasure in doing things Not at all   Feeling down, depressed, irritable, or hopeless Not at all   Total Score PHQ 2 0       Abuse Screening:  Abuse Screening Questionnaire 2/7/2019   Do you ever feel afraid of your partner? N   Are you in a relationship with someone who physically or mentally threatens you? N   Is it safe for you to go home? Y       Fall Risk  Fall Risk Assessment, last 12 mths 5/20/2020   Able to walk? Yes   Fall in past 12 months? No       Coordination of Care:  1. Have you been to the ER, urgent care clinic since your last visit? Hospitalized since your last visit?  no     2. Have you seen or consulted any other health care providers outside of the [address] [place] [address] since your last visit? Include any pap smears or colon screening. no    Last  Checked: n/a  Last UDS Checked: n/a  Last Pain contract signed: n/a    Additional instruction for patient to standby for connection with Dr. Aristides Khan.

## 2020-05-20 NOTE — PROGRESS NOTES
Marianela Bledsoe is a 75 y.o. female who was seen by synchronous (real-time) telephone technology on 2020. Hematology/Oncology  Progress Note    Name: Lary Alfredo  Date: 20  : 1945     PCP: Dr. Tomasz Marley     Ms. Bundy is a 75 y. o.  female with refractory anemia/early MDS (. She had a bone marrow biopsy on 13 that revealed no evidence of hematopoetic neoplasm or dysplasia.      Current Therapy: CBC every 3 months, Retacrit will be warranted if Hgb/Hct drops below 10 and 30% respectively. Consent: Marianela Bledsoe, who was seen by synchronous (real-time) audio-video technology, and/or her healthcare decision maker, is aware that this patient-initiated, Telehealth encounter on 2020 is a billable service, with coverage as determined by her insurance carrier. She is aware that she may receive a bill and has provided verbal consent to proceed: Yes. Assessment & Plan:   Diagnoses and all orders for this visit:    1. MDS (myelodysplastic syndrome) ([facility]    2. Anemia, unspecified type    3. Thyroid mass    4. Epigastric pain        The complexity of medical decision making for this visit is moderate       MDS/refractory anemia: Patient has been followed by Dr. Iain Ricks. Her latest labs in the system on 2020 showed ferritin 183, transferrin saturation 14%, WBC 8.8, H&H 10.3/32.1, platelet 206. Repeat labs done on 2020 showed WBC 5.5, H&H 12/36, platelet 799. MCV 88, ferritin 130, transferrin saturation 18%. Normal B12 and folate. BUN 15 and creatinine 1.03. I do not see any evidence of patient having MDS/refractory anemia. I will therefore check this diagnosis from her chart. Anemia has resolved. Chronic anemia:  Resolved. On 2020, WBC 5.5, H&H 12/36, platelet 724, MCV 88. Normal ferritin with slightly decreased transferrin saturation of 18%. Normal B12 and folate. No action needed.     Thyroid mass:  Follow-up with endocrinology     Epigastric with guarding: CT abdomen and pelvis without contrast done on 5/7/2020 showed 1. Nonobstructing 1 cm left renal calculus. No hydronephrosis. 2. No acute abnormality identified in the abdomen.     Follow up in 6 months     continue present plan, routine labs ordered, have labs drawn prior to ROV, call if any problems  There are no Patient Instructions on file for this visit.        I spent at least 23 minutes on this visit with this established patient. [phone]            Subjective:      Ms. Gayathri Diaz is a 79-year-old woman with myelodysplastic syndrome and refractory anemia. She was diagnosed in July 2013. She reports some LUQ discomfort occasionally. She states she has been doing well since her last visit. She is following with endocrinology for hypothyroidism. She denies shortness of breath, chest pain, unexplained fevers or unexplained weight changes. She states her appetite and energy level is reasonably good. Denies any fever or chills. She continues to take her iron once daily.  She does not have any new complaints or concerns to report at this time. All other points of ROS have been reviewed and were negative. ECOG PS=0. Independent with ADLs and IADLs.    Past medical history, family history, and social history: these were reviewed and remain unchanged. Prior to Admission medications    Medication Sig Start Date End Date Taking? Authorizing Provider   budesonide (PULMICORT) 0.5 mg/2 mL nbsp USE 1 VIAL TWICE A DAY IN SALINE IRRIGATION RINSE 4/28/20   Provider, Historical   amLODIPine (NORVASC) 2.5 mg tablet  4/6/20   Provider, Historical   lancets (TRUEplus Lancets) 28 gauge misc TEST 3 TIMES DAILY 3/26/20   Provider, Historical   HYDROcodone-acetaminophen (NORCO) 5-325 mg per tablet  2/11/20   Provider, Historical   methocarbamol (ROBAXIN) 750 mg tablet Take 1 Tab by mouth three (3) times daily.  10/31/18   Margot Rosenbaum PA-C   nitroglycerin (NITROSTAT) 0.4 mg SL tablet 1 Tab by SubLINGual route DIALYSIS PRN for Chest Pain. 7/1/18   Greg Gannon MD   glimepiride (AMARYL) 1 mg tablet Take 1 mg by mouth Daily (before breakfast). Provider, Historical   Loratadine (CHILDREN'S CLARITIN) 5 mg chew Take 1 Tab by mouth daily. Provider, Historical   levocetirizine (XYZAL) 5 mg tablet Take  by mouth daily. Provider, Historical   LACTOBACILLUS ACIDOPHILUS (PROBIOTIC PO) Take  by mouth daily. Provider, Historical   ascorbic acid, vitamin C, (VITAMIN C) 500 mg tablet Take  by mouth daily. Provider, Historical   metFORMIN (GLUCOPHAGE) 500 mg tablet 250 mg daily as needed. 11/15/13   Provider, Historical   levothyroxine (SYNTHROID) 50 mcg tablet Take  by mouth Daily (before breakfast). Provider, Historical   ergocalciferol (VITAMIN D2) 50,000 unit capsule Take 50,000 Units by mouth every fourteen (14) days. Provider, Historical   carvedilol (COREG) 25 mg tablet Take 25 mg by mouth two (2) times daily (with meals). Provider, Historical   pantoprazole (PROTONIX) 40 mg tablet Take 40 mg by mouth daily. Provider, Historical   aspirin 81 mg tablet Take 81 mg by mouth. Provider, Historical   quinapril (ACCUPRIL) 40 mg tablet Take 40 mg by mouth daily. Provider, Historical   simvastatin (ZOCOR) 40 mg tablet Take  by mouth nightly. Provider, Historical   multivitamin (ONE A DAY) tablet Take 1 Tab by mouth daily.     Provider, Historical     Allergies   Allergen Reactions    Codeine Other (comments)    Iodinated Contrast Media Other (comments)    Tramadol Other (comments)       Patient Active Problem List   Diagnosis Code    Anemia D64.9    MDS (myelodysplastic syndrome) ([facility] [address] D46.9    H/O thyroidectomy Z90.09    Thyroid mass E07.9    Cardiomyopathy ([facility] [address] I42.9    Mixed hyperlipidemia E78.2    Gastroesophageal reflux disease with esophagitis K21.0    Chest pain, unspecified R07.9    Atherosclerosis of coronary artery I25.10    Chronic kidney disease, stage III (moderate) ([facility] N18.3    Colon polyps K63.5    Diabetes mellitus ([facility] E11.9    Diabetic retinopathy ([facility] E11.319    Essential hypertension, benign I10    GERD (gastroesophageal reflux disease) K21.9    Osteopenia M85.80    Hypothyroidism E03.9    Pacemaker Z95.0    S/P VIPIN-BSO Z90.710, Z90.722, Z90.79    Thyroid nodule E04.1    Epigastric pain R10.13     Patient Active Problem List    Diagnosis Date Noted    Chest pain, unspecified 04/05/2015     Priority: 1 - One    Anemia 07/03/2013     Priority: 1 - One    Gastroesophageal reflux disease with esophagitis 06/30/2018     Priority: 2 - Two    Mixed hyperlipidemia 01/10/2012     Priority: 3 - Three    Epigastric pain 05/06/2020    H/O thyroidectomy 10/04/2017    Thyroid mass 10/04/2017    Pacemaker 08/25/2017    Chronic kidney disease, stage III (moderate) ([facility] 08/18/2016    Thyroid nodule 08/18/2016    MDS (myelodysplastic syndrome) ([facility] 10/21/2013    Atherosclerosis of coronary artery 03/21/2013    Cardiomyopathy ([facility] 01/10/2012    Colon polyps 01/10/2012    Diabetes mellitus ([facility] 01/10/2012    Diabetic retinopathy ([facility] 01/10/2012    Essential hypertension, benign 01/10/2012    GERD (gastroesophageal reflux disease) 01/10/2012    Osteopenia 01/10/2012    Hypothyroidism 01/10/2012    S/P VIPIN-BSO 01/10/2012     Current Outpatient Medications   Medication Sig Dispense Refill    budesonide (PULMICORT) 0.5 mg/2 mL nbsp USE 1 VIAL TWICE A DAY IN SALINE IRRIGATION RINSE      amLODIPine (NORVASC) 2.5 mg tablet       lancets (TRUEplus Lancets) 28 gauge misc TEST 3 TIMES DAILY      methocarbamol (ROBAXIN) 750 mg tablet Take 1 Tab by mouth three (3) times daily. 15 Tab 0    nitroglycerin (NITROSTAT) 0.4 mg SL tablet 1 Tab by SubLINGual route DIALYSIS PRN for Chest Pain. 20 Tab 0    glimepiride (AMARYL) 1 mg tablet Take 1 mg by mouth Daily (before breakfast).  Loratadine (CHILDREN'S CLARITIN) 5 mg chew Take 1 Tab by mouth daily.       levocetirizine (XYZAL) 5 mg tablet Take  by mouth daily.  LACTOBACILLUS ACIDOPHILUS (PROBIOTIC PO) Take  by mouth daily.  ascorbic acid, vitamin C, (VITAMIN C) 500 mg tablet Take  by mouth daily.  metFORMIN (GLUCOPHAGE) 500 mg tablet 250 mg daily as needed.  levothyroxine (SYNTHROID) 50 mcg tablet Take  by mouth Daily (before breakfast).  ergocalciferol (VITAMIN D2) 50,000 unit capsule Take 50,000 Units by mouth every fourteen (14) days.  carvedilol (COREG) 25 mg tablet Take 25 mg by mouth two (2) times daily (with meals).  pantoprazole (PROTONIX) 40 mg tablet Take 40 mg by mouth daily.  aspirin 81 mg tablet Take 81 mg by mouth.  quinapril (ACCUPRIL) 40 mg tablet Take 40 mg by mouth daily.  simvastatin (ZOCOR) 40 mg tablet Take  by mouth nightly.  multivitamin (ONE A DAY) tablet Take 1 Tab by mouth daily.       HYDROcodone-acetaminophen (NORCO) 5-325 mg per tablet        Allergies   Allergen Reactions    Codeine Other (comments)    Iodinated Contrast Media Other (comments)    Tramadol Other (comments)     Past Medical History:   Diagnosis Date    Allergic rhinitis     Anemia     Anemia 2018    Anemia NEC     Arthritis     Blurred vision     Cardiac defibrillator in place     Diabetes mellitus ([address]     Dyspepsia and other specified disorders of function of stomach     GERD (gastroesophageal reflux disease)     Heart attack ([address]     Heart disease     Hemorrhoid     Hyperlipemia     Hypertension     Ill-defined condition     Myeloblastic Syndrome    Joint pain     LBBB (left bundle branch block)     MDS (myelodysplastic syndrome) (HCC)     Myelodysplastic syndrome, low grade ([address] 2018    Pacemaker     Pacemaker     Sinus problem     SOB (shortness of breath)     Thyroid disorder     Vitamin D deficiency      Past Surgical History:   Procedure Laterality Date    HX CATARACT REMOVAL Bilateral     HX CHOLECYSTECTOMY      HX HYSTERECTOMY      HX PACEMAKER      [employer]    HX PACEMAKER PLACEMENT      HX THYROIDECTOMY       Family History   Problem Relation Age of Onset    Diabetes Mother     Diabetes Father     Cancer Father     Heart Disease Father     Hypertension Father     Diabetes Sister     Hypertension Sister     Stroke Sister     Diabetes Sister     Hypertension Sister      Social History     Tobacco Use    Smoking status: Never Smoker    Smokeless tobacco: Never Used   Substance Use Topics    Alcohol use: No       ROS      Objective: Additional exam findings: We discussed the expected course, resolution and complications of the diagnosis(es) in detail. Medication risks, benefits, costs, interactions, and alternatives were discussed as indicated. I advised her to contact the office if her condition worsens, changes or fails to improve as anticipated. She expressed understanding with the diagnosis(es) and plan. Johnny Seaman is a 75 y.o. female who was evaluated by a telephone visit encounter for concerns as above. Patient identification was verified prior to start of the visit. A caregiver was present when appropriate. Due to this being a TeleHealth encounter ([place] [facility]), evaluation of the following organ systems was limited: Vitals/Constitutional/EENT/Resp/CV/GI//MS/Neuro/Skin/Heme-Lymph-Imm. Pursuant to the emergency declaration under the [facility], [facility]5 waiver authority and the [employer] Act, this Virtual  Visit was conducted, with patient's (and/or legal guardian's) consent, to reduce the patient's risk of exposure to COVID-19 and provide necessary medical care. Services were provided through a telephone synchronous discussion virtually to substitute for in-person clinic visit. Patient and provider were located at their individual homes.       Thania Richards Merline Knott MD

## 2020-05-21 DIAGNOSIS — R10.13 EPIGASTRIC PAIN: ICD-10-CM

## 2020-11-16 ENCOUNTER — HOSPITAL ENCOUNTER (OUTPATIENT)
Dept: INFUSION THERAPY | Age: 75
Discharge: HOME | End: 2020-11-16
Payer: MEDICARE

## 2020-11-16 VITALS
OXYGEN SATURATION: 100 % | TEMPERATURE: 98.1 F | SYSTOLIC BLOOD PRESSURE: 121 MMHG | HEART RATE: 83 BPM | DIASTOLIC BLOOD PRESSURE: 72 MMHG

## 2020-11-16 LAB
ALBUMIN SERPL-MCNC: 3.8 G/DL (ref 3.4–5)
ALBUMIN/GLOB SERPL: 1 {RATIO} (ref 0.8–1.7)
ALP SERPL-CCNC: 97 U/L (ref 45–117)
ALT SERPL-CCNC: 16 U/L (ref 13–56)
ANION GAP SERPL CALC-SCNC: 3 MMOL/L (ref 3–18)
AST SERPL-CCNC: 18 U/L (ref 10–38)
BASO+EOS+MONOS # BLD AUTO: 0.6 K/UL (ref 0–2.3)
BASO+EOS+MONOS NFR BLD AUTO: 10 % (ref 0.1–17)
BILIRUB SERPL-MCNC: 0.6 MG/DL (ref 0.2–1)
BUN SERPL-MCNC: 20 MG/DL (ref 7–18)
BUN/CREAT SERPL: 20 (ref 12–20)
CALCIUM SERPL-MCNC: 9.3 MG/DL (ref 8.5–10.1)
CHLORIDE SERPL-SCNC: 106 MMOL/L (ref 100–111)
CO2 SERPL-SCNC: 30 MMOL/L (ref 21–32)
CREAT SERPL-MCNC: 0.99 MG/DL (ref 0.6–1.3)
DIFFERENTIAL METHOD BLD: ABNORMAL
ERYTHROCYTE [DISTWIDTH] IN BLOOD BY AUTOMATED COUNT: 12.8 % (ref 11.5–14.5)
GLOBULIN SER CALC-MCNC: 4 G/DL (ref 2–4)
GLUCOSE SERPL-MCNC: 122 MG/DL (ref 74–99)
HCT VFR BLD AUTO: 33.5 % (ref 36–48)
HGB BLD-MCNC: 11.2 G/DL (ref 12–16)
LYMPHOCYTES # BLD: 1.5 K/UL (ref 1.1–5.9)
LYMPHOCYTES NFR BLD: 24 % (ref 14–44)
MCH RBC QN AUTO: 29.8 PG (ref 25–35)
MCHC RBC AUTO-ENTMCNC: 33.4 G/DL (ref 31–37)
MCV RBC AUTO: 89.1 FL (ref 78–102)
NEUTS SEG # BLD: 4.3 K/UL (ref 1.8–9.5)
NEUTS SEG NFR BLD: 67 % (ref 40–70)
PLATELET # BLD AUTO: 169 K/UL (ref 140–440)
POTASSIUM SERPL-SCNC: 3.9 MMOL/L (ref 3.5–5.5)
PROT SERPL-MCNC: 7.8 G/DL (ref 6.4–8.2)
RBC # BLD AUTO: 3.76 M/UL (ref 4.1–5.1)
SODIUM SERPL-SCNC: 139 MMOL/L (ref 136–145)
WBC # BLD AUTO: 6.4 K/UL (ref 4.5–13)

## 2020-11-16 PROCEDURE — 82607 VITAMIN B-12: CPT

## 2020-11-16 PROCEDURE — 85025 COMPLETE CBC W/AUTO DIFF WBC: CPT

## 2020-11-16 PROCEDURE — 80053 COMPREHEN METABOLIC PANEL: CPT

## 2020-11-16 PROCEDURE — 36415 COLL VENOUS BLD VENIPUNCTURE: CPT

## 2020-11-16 PROCEDURE — 82728 ASSAY OF FERRITIN: CPT

## 2020-11-16 PROCEDURE — 83540 ASSAY OF IRON: CPT

## 2020-11-16 NOTE — PROGRESS NOTES
[facility] Progress Note    Date: 2020    Name: Luci Babcock    MRN: [id number]         : 1945    Peripheral Lab Draw      Ms. Rand Oquendo to [facility], ambulatory at [phone] accompanied by self. Pt was assessed and education was provided. Ms. Russ Cardenas vitals were reviewed and patient was observed for 5 minutes prior to treatment. Visit Vitals  /72 (BP 1 Location: Left arm, BP Patient Position: Sitting)   Pulse 83   Temp 98.1 °F (36.7 °C)   SpO2 100%     Recent Results (from the past 12 hour(s))   CBC WITH 3 PART DIFF    Collection Time: 20  2:16 PM   Result Value Ref Range    WBC 6.4 4.5 - 13.0 K/uL    RBC 3.76 (L) 4.10 - 5.10 M/uL    HGB 11.2 (L) 12.0 - 16.0 g/dL    HCT 33.5 (L) 36 - 48 %    MCV 89.1 78 - 102 FL    MCH 29.8 25.0 - 35.0 PG    MCHC 33.4 31 - 37 g/dL    RDW 12.8 11.5 - 14.5 %    PLATELET 807 292 - 304 K/uL    NEUTROPHILS 67 40 - 70 %    MIXED CELLS 10 0.1 - 17 %    LYMPHOCYTES 24 14 - 44 %    ABS. NEUTROPHILS 4.3 1.8 - 9.5 K/UL    ABS. MIXED CELLS 0.6 0.0 - 2.3 K/uL    ABS. LYMPHOCYTES 1.5 1.1 - 5.9 K/UL    DF AUTOMATED         Blood obtained peripherally from left arm x 1 attempt with butterfly needle and sent to lab for Cbc w/diff, Cmp, iron Profile, Ferritin, Vitamin B12 & Folate per written orders. No bleeding or hematoma noted at site. Gauze and coban applied. Ms. Rnad Oquendo tolerated the phlebotomy, and had no complaints. Patient armband removed and shredded. Ms. Rand Oquendo was discharged from [place] [address] in stable condition at 1416.      Mikey Boyd Phlebotomist PCT  2020  3:08 PM

## 2020-11-17 ENCOUNTER — OFFICE VISIT (OUTPATIENT)
Age: 75
End: 2020-11-17
Payer: MEDICARE

## 2020-11-17 VITALS
SYSTOLIC BLOOD PRESSURE: 132 MMHG | WEIGHT: 133 LBS | OXYGEN SATURATION: 99 % | RESPIRATION RATE: 18 BRPM | HEART RATE: 80 BPM | DIASTOLIC BLOOD PRESSURE: 79 MMHG | BODY MASS INDEX: 22.83 KG/M2

## 2020-11-17 DIAGNOSIS — R10.13 EPIGASTRIC PAIN: ICD-10-CM

## 2020-11-17 DIAGNOSIS — D64.9 ANEMIA, UNSPECIFIED TYPE: ICD-10-CM

## 2020-11-17 DIAGNOSIS — D46.9 MDS (MYELODYSPLASTIC SYNDROME) (HCC): Primary | ICD-10-CM

## 2020-11-17 LAB
FERRITIN SERPL-MCNC: 119 NG/ML (ref 8–388)
FOLATE SERPL-MCNC: >20 NG/ML (ref 3.1–17.5)
IRON SATN MFR SERPL: 28 % (ref 20–50)
IRON SERPL-MCNC: 69 UG/DL (ref 50–175)
TIBC SERPL-MCNC: 247 UG/DL (ref 250–450)
VIT B12 SERPL-MCNC: >2000 PG/ML (ref 211–911)

## 2020-11-17 PROCEDURE — G8399 PT W/DXA RESULTS DOCUMENT: HCPCS | Performed by: INTERNAL MEDICINE

## 2020-11-17 PROCEDURE — 1090F PRES/ABSN URINE INCON ASSESS: CPT | Performed by: INTERNAL MEDICINE

## 2020-11-17 PROCEDURE — G8536 NO DOC ELDER MAL SCRN: HCPCS | Performed by: INTERNAL MEDICINE

## 2020-11-17 PROCEDURE — G8752 SYS BP LESS 140: HCPCS | Performed by: INTERNAL MEDICINE

## 2020-11-17 PROCEDURE — 99213 OFFICE O/P EST LOW 20 MIN: CPT | Performed by: INTERNAL MEDICINE

## 2020-11-17 PROCEDURE — G8427 DOCREV CUR MEDS BY ELIG CLIN: HCPCS | Performed by: INTERNAL MEDICINE

## 2020-11-17 PROCEDURE — G8420 CALC BMI NORM PARAMETERS: HCPCS | Performed by: INTERNAL MEDICINE

## 2020-11-17 PROCEDURE — 1101F PT FALLS ASSESS-DOCD LE1/YR: CPT | Performed by: INTERNAL MEDICINE

## 2020-11-17 PROCEDURE — G8432 DEP SCR NOT DOC, RNG: HCPCS | Performed by: INTERNAL MEDICINE

## 2020-11-17 PROCEDURE — G8754 DIAS BP LESS 90: HCPCS | Performed by: INTERNAL MEDICINE

## 2020-11-17 NOTE — PROGRESS NOTES
Ashley Cherry is a 67 y.o. female presenting today for a follow-up appointment. Patient is ambulatory with no assistive devices and reports stress and decreased appetite. Provider was advised. Chief Complaint   Patient presents with    Anemia     MDS       Visit Vitals  /79   Pulse 80   Resp 18   Wt 133 lb (60.3 kg)   SpO2 99%   BMI 22.83 kg/m²       Current Outpatient Medications   Medication Sig    budesonide (PULMICORT) 0.5 mg/2 mL nbsp USE 1 VIAL TWICE A DAY IN SALINE IRRIGATION RINSE    amLODIPine (NORVASC) 2.5 mg tablet     lancets (TRUEplus Lancets) 28 gauge misc TEST 3 TIMES DAILY    nitroglycerin (NITROSTAT) 0.4 mg SL tablet 1 Tab by SubLINGual route DIALYSIS PRN for Chest Pain.  glimepiride (AMARYL) 1 mg tablet Take 1 mg by mouth Daily (before breakfast).  Loratadine (CHILDREN'S CLARITIN) 5 mg chew Take 1 Tab by mouth daily.  levocetirizine (XYZAL) 5 mg tablet Take  by mouth daily.  LACTOBACILLUS ACIDOPHILUS (PROBIOTIC PO) Take  by mouth daily.  ascorbic acid, vitamin C, (VITAMIN C) 500 mg tablet Take  by mouth daily.  metFORMIN (GLUCOPHAGE) 500 mg tablet 500 mg daily as needed.  levothyroxine (SYNTHROID) 50 mcg tablet Take 75 mcg by mouth Daily (before breakfast).  ergocalciferol (VITAMIN D2) 50,000 unit capsule Take 50,000 Units by mouth every fourteen (14) days.  carvedilol (COREG) 25 mg tablet Take 25 mg by mouth two (2) times daily (with meals).  pantoprazole (PROTONIX) 40 mg tablet Take 40 mg by mouth daily.  aspirin 81 mg tablet Take 81 mg by mouth.  quinapril (ACCUPRIL) 40 mg tablet Take 40 mg by mouth daily.  simvastatin (ZOCOR) 40 mg tablet Take  by mouth nightly.  multivitamin (ONE A DAY) tablet Take 1 Tab by mouth daily. No current facility-administered medications for this visit. Fall Risk Assessment, last 12 mths 5/20/2020   Able to walk? Yes   Fall in past 12 months?  No       3 most recent PHQ Screens 5/20/2020   Little interest or pleasure in doing things Not at all   Feeling down, depressed, irritable, or hopeless Not at all   Total Score PHQ 2 0       Abuse Screening Questionnaire 2/7/2019   Do you ever feel afraid of your partner? N   Are you in a relationship with someone who physically or mentally threatens you? N   Is it safe for you to go home? Y       1. Have you been to the ER, urgent care clinic since your last visit? Hospitalized since your last visit? No    2. Have you seen or consulted any other health care providers outside of the [address] [place] [address] since your last visit? Include any pap smears or colon screening.  No Home

## 2020-11-17 NOTE — PROGRESS NOTES
Hematology/Oncology  Progress Note     Name: Lary Rivera  Date: 20  : 1945     PCP: Dr. Kat Princean     Ms. Bundy is a 75 y. o.  female with refractory anemia/early MDS (. She had a bone marrow biopsy on 13 that revealed no evidence of hematopoetic neoplasm or dysplasia.      Current Therapy: CBC every 3 months, Retacrit will be warranted if Hgb/Hct drops below 10 and 30% respectively. Subjective:      Ms. Luis Clement is a 78-year-old woman with myelodysplastic syndrome and refractory anemia. She was diagnosed in 2013. She states she has been doing well since her last visit. She is following with endocrinology for hypothyroidism. She denies shortness of breath, chest pain, unexplained fevers or unexplained weight changes. She states her appetite and energy level is reasonably good. Denies any fever or chills.   She does not have any new complaints or concerns to report at this time. under complaints today is being stressed and having slightly decreased appetite. All other points of ROS have been reviewed and were negative. ECOG PS=0. Independent with ADLs and IADLs.       Past medical history, family history, and social history: these were reviewed and remain unchanged.       Past Medical History:   Diagnosis Date    Allergic rhinitis     Anemia     Anemia 2018    Anemia NEC     Arthritis     Blurred vision     Cardiac defibrillator in place     Diabetes mellitus ([address]     Dyspepsia and other specified disorders of function of stomach     GERD (gastroesophageal reflux disease)     Heart attack ([address]     Heart disease     Hemorrhoid     Hyperlipemia     Hypertension     Ill-defined condition     Myeloblastic Syndrome    Joint pain     LBBB (left bundle branch block)     MDS (myelodysplastic syndrome) (HCC)     Myelodysplastic syndrome, low grade ([address] 2018    Pacemaker     Pacemaker     Sinus problem     SOB (shortness of breath)     Thyroid disorder     Vitamin D deficiency      Past Surgical History:   Procedure Laterality Date    HX CATARACT REMOVAL Bilateral     HX CHOLECYSTECTOMY      HX HYSTERECTOMY      HX PACEMAKER      [employer]    HX PACEMAKER PLACEMENT      HX THYROIDECTOMY       Social History     Socioeconomic History    Marital status:      Spouse name: Not on file    Number of children: Not on file    Years of education: Not on file    Highest education level: Not on file   Tobacco Use    Smoking status: Never Smoker    Smokeless tobacco: Never Used   Substance and Sexual Activity    Alcohol use: No    Drug use: No     Family History   Problem Relation Age of Onset    Diabetes Mother     Diabetes Father     Cancer Father     Heart Disease Father     Hypertension Father     Diabetes Sister     Hypertension Sister     Stroke Sister     Diabetes Sister     Hypertension Sister        Current Outpatient Medications   Medication Sig Dispense Refill    budesonide (PULMICORT) 0.5 mg/2 mL nbsp USE 1 VIAL TWICE A DAY IN SALINE IRRIGATION RINSE      amLODIPine (NORVASC) 2.5 mg tablet       lancets (TRUEplus Lancets) 28 gauge misc TEST 3 TIMES DAILY      nitroglycerin (NITROSTAT) 0.4 mg SL tablet 1 Tab by SubLINGual route DIALYSIS PRN for Chest Pain. 20 Tab 0    glimepiride (AMARYL) 1 mg tablet Take 1 mg by mouth Daily (before breakfast).  Loratadine (CHILDREN'S CLARITIN) 5 mg chew Take 1 Tab by mouth daily.  levocetirizine (XYZAL) 5 mg tablet Take  by mouth daily.  LACTOBACILLUS ACIDOPHILUS (PROBIOTIC PO) Take  by mouth daily.  ascorbic acid, vitamin C, (VITAMIN C) 500 mg tablet Take  by mouth daily.  metFORMIN (GLUCOPHAGE) 500 mg tablet 500 mg daily as needed.  levothyroxine (SYNTHROID) 50 mcg tablet Take 75 mcg by mouth Daily (before breakfast).  ergocalciferol (VITAMIN D2) 50,000 unit capsule Take 50,000 Units by mouth every fourteen (14) days.       carvedilol (COREG) 25 mg tablet Take 25 mg by mouth two (2) times daily (with meals).  pantoprazole (PROTONIX) 40 mg tablet Take 40 mg by mouth daily.  aspirin 81 mg tablet Take 81 mg by mouth.  quinapril (ACCUPRIL) 40 mg tablet Take 40 mg by mouth daily.  simvastatin (ZOCOR) 40 mg tablet Take  by mouth nightly.  multivitamin (ONE A DAY) tablet Take 1 Tab by mouth daily. Allergies   Allergen Reactions    Codeine Other (comments)    Iodinated Contrast Media Other (comments)    Tramadol Other (comments)    Simvastatin Other (comments)     Muscle joint pain       Review of Systems  As per HPI      Objective:  Visit Vitals  /79   Pulse 80   Resp 18   Wt 60.3 kg (133 lb)   SpO2 99%   BMI 22.83 kg/m²         Physical Exam:   General appearance - alert, well appearing, and in no distress  Mental status - alert, oriented to person, place, and time  EYE-LYNNETTE, EOMI  ENT-ENT exam normal, no neck nodes or sinus tenderness  Mouth - mucous membranes moist, pharynx normal without lesions  Neck - supple, no significant adenopathy   Chest - clear to auscultation, no wheezes, rales or rhonchi, symmetric air entry   Heart - normal rate and regular rhythm   Abdomen - soft, nontender, nondistended, no masses or organomegaly  Lymph- no adenopathy palpable  Ext-no pedal edema noted  Skin-Warm and dry. Neuro -alert, oriented, normal speech, no focal findings or movement disorder noted  Breast - no masses palapated b/l    Physical Exam     Diagnostic Imaging     No results found for this or any previous visit. Results for orders placed during the hospital encounter of 09/26/18   XR CHEST PA LAT    Narrative INDICATION:  Hypotension, chest pain, near syncope       EXAMINATION:  XR CHEST PA LAT    COMPARISON:  6/30/2018    FINDINGS:  The study shows a normal sized heart. . The lungs are clear and well expanded. ICD in good position. Impression IMPRESSION:  No acute pulmonary process.          Results for orders placed in visit on 05/21/20   CT ABD WO CONT       Lab Results  Lab Results   Component Value Date/Time    WBC 6.4 11/16/2020 02:16 PM    HGB 11.2 (L) 11/16/2020 02:16 PM    HCT 33.5 (L) 11/16/2020 02:16 PM    PLATELET 507 61/19/0629 02:16 PM    MCV 89.1 11/16/2020 02:16 PM       Lab Results   Component Value Date/Time    Sodium 139 11/16/2020 02:15 PM    Potassium 3.9 11/16/2020 02:15 PM    Chloride 106 11/16/2020 02:15 PM    CO2 30 11/16/2020 02:15 PM    Anion gap 3 11/16/2020 02:15 PM    Glucose 122 (H) 11/16/2020 02:15 PM    BUN 20 (H) 11/16/2020 02:15 PM    Creatinine 0.99 11/16/2020 02:15 PM    BUN/Creatinine ratio 20 11/16/2020 02:15 PM    GFR est AA >60 11/16/2020 02:15 PM    GFR est non-AA 55 (L) 11/16/2020 02:15 PM    Calcium 9.3 11/16/2020 02:15 PM    Alk. phosphatase 97 11/16/2020 02:15 PM    Protein, total 7.8 11/16/2020 02:15 PM    Albumin 3.8 11/16/2020 02:15 PM    Globulin 4.0 11/16/2020 02:15 PM    A-G Ratio 1.0 11/16/2020 02:15 PM    ALT (SGPT) 16 11/16/2020 02:15 PM   Follow-up with PCP for health maintenance  Assessment/Plan:  No diagnosis found. No orders of the defined types were placed in this encounter. MDS/refractory anemia: Patient has been followed by Dr. Colt Haney. Labs on 11/16/2020 showed ferritin 119, transferrin saturation 28%, normal B12 and folate. WBC 6.4, H&H 11.2/33.5, platelet 164. MCV 89.1. BUN 20, creatinine 0.99. Patient is clinically stable and I have no evidence that she has MDS/refractory anemia. Continue follow-up. Chronic anemia:  Resolved. Labs as above. Continue follow-up. Thyroid mass: Follow-up with endocrinology          Follow up in 6 months  call if any problems  There are no Patient Instructions on file for this visit.        Gia Londono MD

## 2020-11-20 DIAGNOSIS — D64.9 ANEMIA, UNSPECIFIED TYPE: ICD-10-CM

## 2021-05-17 DIAGNOSIS — D46.9 MDS (MYELODYSPLASTIC SYNDROME) (HCC): ICD-10-CM

## 2021-05-21 DIAGNOSIS — D64.9 ANEMIA, UNSPECIFIED TYPE: Primary | ICD-10-CM

## 2021-05-21 DIAGNOSIS — E53.8 B12 DEFICIENCY: ICD-10-CM

## 2021-05-21 DIAGNOSIS — D64.9 ANEMIA, UNSPECIFIED TYPE: ICD-10-CM

## 2021-05-24 ENCOUNTER — TELEPHONE (OUTPATIENT)
Age: 76
End: 2021-05-24

## 2021-05-24 NOTE — TELEPHONE ENCOUNTER
Patient stated her lab results were posted in [address] and that she saw her Vitamin B12 was high.  is worried about her liver and kidneys. Patient wants to know if she needs to come into office for her appointment with  tomorrow instead of having a telephone appointment. Patient also wanted  to know that she keeps losing weight. Patient stated she used to weigh 131.5, however yesterday morning she weighed 128 and this morning she weighed 129.4. Patient stated she had never been that low before. Please advise.

## 2021-05-25 ENCOUNTER — VIRTUAL VISIT (OUTPATIENT)
Age: 76
End: 2021-05-25
Payer: MEDICARE

## 2021-05-25 DIAGNOSIS — D50.8 IRON DEFICIENCY ANEMIA SECONDARY TO INADEQUATE DIETARY IRON INTAKE: ICD-10-CM

## 2021-05-25 DIAGNOSIS — D46.9 MDS (MYELODYSPLASTIC SYNDROME) (HCC): ICD-10-CM

## 2021-05-25 DIAGNOSIS — E53.8 B12 DEFICIENCY: ICD-10-CM

## 2021-05-25 DIAGNOSIS — D64.9 ANEMIA, UNSPECIFIED TYPE: Primary | ICD-10-CM

## 2021-05-25 PROCEDURE — 99442 PR PHYS/QHP TELEPHONE EVALUATION 11-20 MIN: CPT | Performed by: INTERNAL MEDICINE

## 2021-05-25 NOTE — PROGRESS NOTES
Judith Narvaez is a 67 y.o. female who was seen by synchronous (real-time) audio- technology on 2021 for Anemia (MDS)  Hematology/Oncology  Progress Note     Name: Lary Zarate  Date: 21  : 1945     PCP: Dr. Darci Rodriguez        Current Therapy: Surveillance      Assessment & Plan:   Diagnoses and all orders for this visit:    1. Anemia, unspecified type  -     CBC WITH AUTOMATED DIFF; Future  -     METABOLIC PANEL, COMPREHENSIVE; Future  -     IRON PROFILE; Future  -     VITAMIN B12 & FOLATE; Future  -     FERRITIN; Future    2. B12 deficiency  -     CBC WITH AUTOMATED DIFF; Future  -     METABOLIC PANEL, COMPREHENSIVE; Future  -     IRON PROFILE; Future  -     VITAMIN B12 & FOLATE; Future  -     FERRITIN; Future    3. MDS (myelodysplastic syndrome) (HCC)  -     CBC WITH AUTOMATED DIFF; Future  -     METABOLIC PANEL, COMPREHENSIVE; Future  -     IRON PROFILE; Future  -     VITAMIN B12 & FOLATE; Future  -     FERRITIN; Future    4. Iron deficiency anemia secondary to inadequate dietary iron intake  -     CBC WITH AUTOMATED DIFF; Future  -     METABOLIC PANEL, COMPREHENSIVE; Future  -     IRON PROFILE; Future  -     VITAMIN B12 & FOLATE; Future  -     FERRITIN; Future        The complexity of medical decision making for this visit is moderate       MDS/refractory anemia: Patient has been followed by Dr. Davi Corona. Labs on 2020 showed ferritin 119, transferrin saturation 28%, normal B12 and folate. WBC 6.4, H&H 11.2/33.5, platelet 538. MCV 89.1. BUN 20, creatinine 0.99. Labs on 21 showed WBC 5.5, H&H 11.4/34.2, MCV 88.4, platelet 828. Peripheral blood smear showed rare immature granulocyte. BUN 19, creatinine 1.1. Patient is clinically stable and I have no evidence that she has MDS/refractory anemia. Continue follow-up.     Chronic anemia:  Resolved. Labs as above.   Continue follow-up.     Thyroid mass: Follow-up with endocrinology           Follow up in 6 months  I spent at least 15 minutes on this visit with this established patient. 712  Subjective:   Ms. Lisa Crawford is a 40-year-old woman with myelodysplastic syndrome and refractory anemia. She was diagnosed in July 2013. She states she has been doing well since her last visit. She is following with endocrinology for hypothyroidism. She denies shortness of breath, chest pain, unexplained fevers or unexplained weight changes. She states her appetite and energy level is reasonably good. Denies any fever or chills.   She does not have any new complaints or concerns to report at this time. under complaints today is being stressed and having slightly decreased appetite. All other points of ROS have been reviewed and were negative. ECOG PS=0. Independent with ADLs and IADLs.       Past medical history, family history, and social history: these were reviewed and remain unchanged.       Prior to Admission medications    Medication Sig Start Date End Date Taking? Authorizing Provider   omega 3-dha-epa-fish oil (Fish Oil) 100-160-1,000 mg cap Take 1 Cap by mouth daily. Yes Other, MD Jeanna   budesonide (PULMICORT) 0.5 mg/2 mL nbsp USE 1 VIAL TWICE A DAY IN SALINE IRRIGATION RINSE 4/28/20  Yes Provider, Historical   amLODIPine (NORVASC) 2.5 mg tablet Take 2.5 mg by mouth daily. 4/6/20  Yes Provider, Historical   nitroglycerin (NITROSTAT) 0.4 mg SL tablet 1 Tab by SubLINGual route DIALYSIS PRN for Chest Pain. 7/1/18  Yes Antonio Crisostomo MD   glimepiride (AMARYL) 1 mg tablet Take 1 mg by mouth Daily (before breakfast). Yes Provider, Historical   Loratadine (CHILDREN'S CLARITIN) 5 mg chew Take 1 Tab by mouth daily. Yes Provider, Historical   levocetirizine (XYZAL) 5 mg tablet Take  by mouth daily. Yes Provider, Historical   LACTOBACILLUS ACIDOPHILUS (PROBIOTIC PO) Take  by mouth daily. Yes Provider, Historical   ascorbic acid, vitamin C, (VITAMIN C) 500 mg tablet Take  by mouth daily.    Yes Provider, Historical   metFORMIN (GLUCOPHAGE) 500 mg tablet Take 500 mg by mouth daily (with breakfast). 11/15/13  Yes Provider, Historical   levothyroxine (SYNTHROID) 50 mcg tablet Take 75 mcg by mouth Daily (before breakfast). Yes Provider, Historical   ergocalciferol (VITAMIN D2) 50,000 unit capsule Take 50,000 Units by mouth every fourteen (14) days. D3 not D2   Yes Provider, Historical   carvedilol (COREG) 25 mg tablet Take 25 mg by mouth two (2) times daily (with meals). Yes Provider, Historical   pantoprazole (PROTONIX) 40 mg tablet Take 40 mg by mouth two (2) times daily as needed. Yes Provider, Historical   aspirin 81 mg tablet Take 81 mg by mouth. Yes Provider, Historical   quinapril (ACCUPRIL) 40 mg tablet Take 40 mg by mouth daily. Yes Provider, Historical   multivitamin (ONE A DAY) tablet Take 1 Tab by mouth daily.    Yes Provider, Historical   lancets (TRUEplus Lancets) 28 gauge misc TEST 3 TIMES DAILY 3/26/20   Provider, Historical     Patient Active Problem List   Diagnosis Code    Anemia D64.9    MDS (myelodysplastic syndrome) ([facility] D46.9    H/O thyroidectomy E89.0    Thyroid mass E07.9    Cardiomyopathy ([facility] I42.9    Mixed hyperlipidemia E78.2    Gastroesophageal reflux disease with esophagitis K21.00    Chest pain, unspecified R07.9    Atherosclerosis of coronary artery I25.10    Chronic kidney disease, stage III (moderate) (HCC) N18.30    Colon polyps K63.5    Diabetes mellitus (HCC) E11.9    Diabetic retinopathy ([facility] E11.319    Essential hypertension, benign I10    GERD (gastroesophageal reflux disease) K21.9    Osteopenia M85.80    Hypothyroidism E03.9    Pacemaker Z95.0    S/P VIPIN-BSO Z90.710, Z90.722, Z90.79    Thyroid nodule E04.1    Epigastric pain R10.13     Patient Active Problem List    Diagnosis Date Noted    Chest pain, unspecified 04/05/2015    Anemia 07/03/2013    Gastroesophageal reflux disease with esophagitis 06/30/2018    Mixed hyperlipidemia 01/10/2012    Epigastric pain 05/06/2020    H/O thyroidectomy 10/04/2017    Thyroid mass 10/04/2017    Pacemaker 08/25/2017    Chronic kidney disease, stage III (moderate) (HCC) 08/18/2016    Thyroid nodule 08/18/2016    MDS (myelodysplastic syndrome) ([facility] 10/21/2013    Atherosclerosis of coronary artery 03/21/2013    Cardiomyopathy ([facility] 01/10/2012    Colon polyps 01/10/2012    Diabetes mellitus ([facility] 01/10/2012    Diabetic retinopathy ([facility] 01/10/2012    Essential hypertension, benign 01/10/2012    GERD (gastroesophageal reflux disease) 01/10/2012    Osteopenia 01/10/2012    Hypothyroidism 01/10/2012    S/P VIPIN-BSO 01/10/2012     Current Outpatient Medications   Medication Sig Dispense Refill    omega 3-dha-epa-fish oil (Fish Oil) 100-160-1,000 mg cap Take 1 Cap by mouth daily.  budesonide (PULMICORT) 0.5 mg/2 mL nbsp USE 1 VIAL TWICE A DAY IN SALINE IRRIGATION RINSE      amLODIPine (NORVASC) 2.5 mg tablet Take 2.5 mg by mouth daily.  nitroglycerin (NITROSTAT) 0.4 mg SL tablet 1 Tab by SubLINGual route DIALYSIS PRN for Chest Pain. 20 Tab 0    glimepiride (AMARYL) 1 mg tablet Take 1 mg by mouth Daily (before breakfast).  Loratadine (CHILDREN'S CLARITIN) 5 mg chew Take 1 Tab by mouth daily.  levocetirizine (XYZAL) 5 mg tablet Take  by mouth daily.  LACTOBACILLUS ACIDOPHILUS (PROBIOTIC PO) Take  by mouth daily.  ascorbic acid, vitamin C, (VITAMIN C) 500 mg tablet Take  by mouth daily.  metFORMIN (GLUCOPHAGE) 500 mg tablet Take 500 mg by mouth daily (with breakfast).  levothyroxine (SYNTHROID) 50 mcg tablet Take 75 mcg by mouth Daily (before breakfast).  ergocalciferol (VITAMIN D2) 50,000 unit capsule Take 50,000 Units by mouth every fourteen (14) days. D3 not D2      carvedilol (COREG) 25 mg tablet Take 25 mg by mouth two (2) times daily (with meals).  pantoprazole (PROTONIX) 40 mg tablet Take 40 mg by mouth two (2) times daily as needed.       aspirin 81 mg tablet Take 81 mg by mouth.  quinapril (ACCUPRIL) 40 mg tablet Take 40 mg by mouth daily.  multivitamin (ONE A DAY) tablet Take 1 Tab by mouth daily.  lancets (TRUEplus Lancets) 28 gauge misc TEST 3 TIMES DAILY       Allergies   Allergen Reactions    Codeine Other (comments)    Iodinated Contrast Media Other (comments)    Tramadol Other (comments)    Simvastatin Other (comments)     Muscle joint pain     Past Medical History:   Diagnosis Date    Allergic rhinitis     Anemia     Anemia 2018    Anemia NEC     Arthritis     Blurred vision     Cardiac defibrillator in place     Diabetes mellitus ([address]     Dyspepsia and other specified disorders of function of stomach     GERD (gastroesophageal reflux disease)     Heart attack ([address]     Heart disease     Hemorrhoid     Hyperlipemia     Hypertension     Ill-defined condition     Myeloblastic Syndrome    Joint pain     LBBB (left bundle branch block)     MDS (myelodysplastic syndrome) (HCC)     Myelodysplastic syndrome, low grade ([address] 2018    Pacemaker     Pacemaker     Sinus problem     SOB (shortness of breath)     Thyroid disorder     Vitamin D deficiency      Past Surgical History:   Procedure Laterality Date    HX CATARACT REMOVAL Bilateral     HX CHOLECYSTECTOMY      HX HYSTERECTOMY      HX PACEMAKER      [employer]    HX PACEMAKER PLACEMENT      HX THYROIDECTOMY       Family History   Problem Relation Age of Onset    Diabetes Mother     Diabetes Father     Cancer Father     Heart Disease Father     Hypertension Father     Diabetes Sister     Hypertension Sister     Stroke Sister     Diabetes Sister     Hypertension Sister      Social History     Tobacco Use    Smoking status: Never Smoker    Smokeless tobacco: Never Used   Substance Use Topics    Alcohol use: Never       ROS: As per HPI    Objective:   No flowsheet data found. General: alert, cooperative, no distress     Additional exam findings:        We discussed the expected course, resolution and complications of the diagnosis(es) in detail. Medication risks, benefits, costs, interactions, and alternatives were discussed as indicated. I advised her to contact the office if her condition worsens, changes or fails to improve as anticipated. She expressed understanding with the diagnosis(es) and plan. Amanda Herrera, was evaluated through a synchronous (real-time) audio-encounter. The patient (or guardian if applicable) is aware that this is a billable service. Verbal consent to proceed has been obtained within the past 12 months. The visit was conducted pursuant to the emergency declaration under the [address] [place] [address], [address] [place] [address] authority and the [employer] General Act. Patient identification was verified, and a caregiver was present when appropriate. The patient was located in a state where the provider was credentialed to provide care.     Jyoti Babb MD

## 2022-06-03 NOTE — PROGRESS NOTES
Juan David Romero is a 75 y.o. female presenting today for a follow-up appointment. Patient is ambulatory with no assistive device(s), is accompanied by self, patient states of \"muscle ache left side of chest.\"    Chief Complaint   Patient presents with    Follow-up     myelodysplastic syndrome       Visit Vitals  /74 (BP 1 Location: Left arm, BP Patient Position: Sitting)   Pulse 69   Temp 97.9 °F (36.6 °C) (Oral)   Resp 16   Ht 5' (1.524 m)   Wt 140 lb (63.5 kg)   SpO2 100%   BMI 27.34 kg/m²       Fall Risk  Fall Risk Assessment, last 12 mths 5/6/2020   Able to walk? Yes   Fall in past 12 months? No       Depression Screening:  3 most recent PHQ Screens 5/6/2020   Little interest or pleasure in doing things Not at all   Feeling down, depressed, irritable, or hopeless Not at all   Total Score PHQ 2 0       Abuse Screening:  Abuse Screening Questionnaire 2/7/2019   Do you ever feel afraid of your partner? N   Are you in a relationship with someone who physically or mentally threatens you? N   Is it safe for you to go home? Y           Coordination of Care:  1. Have you been to the ER, urgent care clinic since your last visit? Hospitalized since your last visit?  no     2. Have you seen or consulted any other health care providers outside of the [address] [place] [address] since your last visit? Include any pap smears or colon screening.   no Elidel Counseling: Patient may experience a mild burning sensation during topical application. Elidel is not approved in children less than 2 years of age. There have been case reports of hematologic and skin malignancies in patients using topical calcineurin inhibitors although causality is questionable.